# Patient Record
Sex: FEMALE | Race: WHITE | NOT HISPANIC OR LATINO | Employment: OTHER | URBAN - METROPOLITAN AREA
[De-identification: names, ages, dates, MRNs, and addresses within clinical notes are randomized per-mention and may not be internally consistent; named-entity substitution may affect disease eponyms.]

---

## 2021-05-17 ENCOUNTER — OFFICE VISIT (OUTPATIENT)
Dept: NEUROLOGY | Facility: CLINIC | Age: 51
End: 2021-05-17

## 2021-05-17 VITALS
RESPIRATION RATE: 18 BRPM | SYSTOLIC BLOOD PRESSURE: 112 MMHG | WEIGHT: 94.8 LBS | BODY MASS INDEX: 16.8 KG/M2 | OXYGEN SATURATION: 97 % | HEIGHT: 63 IN | HEART RATE: 67 BPM | DIASTOLIC BLOOD PRESSURE: 80 MMHG | TEMPERATURE: 98 F

## 2021-05-17 DIAGNOSIS — G35 MULTIPLE SCLEROSIS (HCC): Primary | ICD-10-CM

## 2021-05-17 DIAGNOSIS — F41.9 ANXIETY: ICD-10-CM

## 2021-05-17 DIAGNOSIS — R53.82 CHRONIC FATIGUE: ICD-10-CM

## 2021-05-17 DIAGNOSIS — R26.9 GAIT ABNORMALITY: ICD-10-CM

## 2021-05-17 PROBLEM — R25.2 SPASMS OF THE HANDS OR FEET: Status: ACTIVE | Noted: 2021-05-17

## 2021-05-17 PROCEDURE — 99204 OFFICE O/P NEW MOD 45 MIN: CPT | Performed by: PSYCHIATRY & NEUROLOGY

## 2021-05-17 RX ORDER — DEXTROAMPHETAMINE SACCHARATE, AMPHETAMINE ASPARTATE, DEXTROAMPHETAMINE SULFATE AND AMPHETAMINE SULFATE 2.5; 2.5; 2.5; 2.5 MG/1; MG/1; MG/1; MG/1
1 TABLET ORAL DAILY
COMMUNITY
Start: 2021-05-11 | End: 2022-11-11

## 2021-05-17 RX ORDER — SODIUM CHLORIDE 9 MG/ML
250 INJECTION, SOLUTION INTRAVENOUS ONCE
OUTPATIENT
Start: 2021-09-15

## 2021-05-17 RX ORDER — ONDANSETRON 8 MG/1
1 TABLET, ORALLY DISINTEGRATING ORAL EVERY 6 HOURS PRN
COMMUNITY
Start: 2021-05-07

## 2021-05-17 RX ORDER — FAMOTIDINE 10 MG/ML
20 INJECTION, SOLUTION INTRAVENOUS AS NEEDED
OUTPATIENT
Start: 2021-09-15

## 2021-05-17 RX ORDER — ACETAMINOPHEN 325 MG/1
650 TABLET ORAL EVERY 6 HOURS PRN
OUTPATIENT
Start: 2021-09-15

## 2021-05-17 RX ORDER — ACETAMINOPHEN 325 MG/1
650 TABLET ORAL ONCE
OUTPATIENT
Start: 2021-09-15

## 2021-05-17 RX ORDER — LEVOTHYROXINE SODIUM 0.05 MG/1
1 TABLET ORAL DAILY
COMMUNITY
Start: 2021-05-09 | End: 2022-06-15

## 2021-05-17 RX ORDER — OXCARBAZEPINE 150 MG/1
150 TABLET, FILM COATED ORAL 2 TIMES DAILY
Qty: 60 TABLET | Refills: 5 | Status: SHIPPED | OUTPATIENT
Start: 2021-05-17 | End: 2021-06-02

## 2021-05-17 RX ORDER — GABAPENTIN 300 MG/1
1 CAPSULE ORAL 2 TIMES DAILY
COMMUNITY
Start: 2021-03-03 | End: 2021-06-02 | Stop reason: SDUPTHER

## 2021-05-17 RX ORDER — DIPHENHYDRAMINE HYDROCHLORIDE 50 MG/ML
50 INJECTION INTRAMUSCULAR; INTRAVENOUS AS NEEDED
OUTPATIENT
Start: 2021-09-15

## 2021-05-17 RX ORDER — ESCITALOPRAM OXALATE 20 MG/1
10 TABLET ORAL DAILY
COMMUNITY
Start: 2021-03-06

## 2021-05-17 RX ORDER — FAMOTIDINE 20 MG
TABLET ORAL
COMMUNITY

## 2021-05-17 RX ORDER — LIDOCAINE 4 G/G
PATCH TOPICAL AS NEEDED
COMMUNITY

## 2021-05-17 RX ORDER — ESTRADIOL 0.5 MG/1
1 TABLET ORAL DAILY
COMMUNITY
Start: 2021-04-28 | End: 2022-06-16 | Stop reason: SDUPTHER

## 2021-05-17 RX ORDER — TIZANIDINE 4 MG/1
1 TABLET ORAL 4 TIMES DAILY
COMMUNITY
Start: 2021-05-07

## 2021-05-17 RX ORDER — OMEPRAZOLE 40 MG/1
1 CAPSULE, DELAYED RELEASE ORAL DAILY
COMMUNITY
Start: 2021-05-09

## 2021-05-17 RX ORDER — IBUPROFEN 400 MG/1
400 TABLET ORAL EVERY 6 HOURS PRN
OUTPATIENT
Start: 2021-09-15

## 2021-05-17 RX ORDER — MEPERIDINE HYDROCHLORIDE 50 MG/ML
25 INJECTION INTRAMUSCULAR; INTRAVENOUS; SUBCUTANEOUS
OUTPATIENT
Start: 2021-09-15

## 2021-05-17 RX ORDER — CLONAZEPAM 1 MG/1
1 TABLET ORAL 3 TIMES DAILY
COMMUNITY
Start: 2021-04-27 | End: 2022-11-11

## 2021-05-17 NOTE — PROGRESS NOTES
"Chief Complaint  Establish Care and Multiple Sclerosis    Subjective          Shayy Fajardo presents to [facility] for RRMS.      History of Present Illness    50 y.o. female referred by Dr Brian Lua.     Sx started 2006.  Right hand 3 - 5 digit numbness that progressed up arm.     Dx Jan 2007 by Dr Levy started on Gilenya.  Continued N/T.      Switched to Zinbryta and then Tysabri.     Switched to Ocrevus.  Last infusion March 2021.      Sx of right hand tremors.  Left UE flexor spasms.   Low back pain.  Sharp shooting pains when flexing neck.    Decreased attention and concentration.      Reviewed medical records:    Tx with Ocrevus.  Sx of weakness and ataxia.  N/T on left side of body.      MRI Brain/cervical 3/4/20 stable frontal white matter lesions., C2 lesion    DMT:  Zinbryta,     MSFC T25FW 11.22  Objective   Vital Signs:   /80   Pulse 67   Temp 98 °F (36.7 °C)   Resp 18   Ht 161 cm (63.4\")   Wt 43 kg (94 lb 12.8 oz)   SpO2 97%   BMI 16.58 kg/m²     Physical Exam  Eyes:      Extraocular Movements: EOM normal.      Pupils: Pupils are equal, round, and reactive to light.   Neurological:      Mental Status: She is oriented to person, place, and time.      Coordination: Finger-Nose-Finger Test, Heel to Shin Test and Romberg Test normal.      Gait: Gait is intact. Tandem walk normal.      Deep Tendon Reflexes: Strength normal.   Psychiatric:         Speech: Speech normal.        Neurologic Exam     Mental Status   Oriented to person, place, and time.   Registration: recalls 3 of 3 objects. Recall at 5 minutes: recalls 3 of 3 objects. Follows 3 step commands.   Attention: normal. Concentration: normal.   Speech: speech is normal   Level of consciousness: alert  Knowledge: good and consistent with education.   Able to name object. Able to read. Able to repeat. Able to write. Normal comprehension.     Cranial Nerves     CN II   Visual fields full to " confrontation.   Visual acuity: normal  Right visual field deficit: none  Left visual field deficit: none     CN III, IV, VI   Pupils are equal, round, and reactive to light.  Extraocular motions are normal.   Right pupil: Shape: regular. Reactivity: brisk. Consensual response: intact.   Left pupil: Shape: regular. Reactivity: brisk. Consensual response: intact.   Nystagmus: none   Diplopia: none  Ophthalmoparesis: none  Upgaze: normal  Downgaze: normal  Conjugate gaze: present  Vestibulo-ocular reflex: present    CN V   Facial sensation intact.   Right corneal reflex: normal  Left corneal reflex: normal    CN VII   Right facial weakness: none  Left facial weakness: none    CN VIII   Hearing: intact    CN IX, X   Palate: symmetric  Right gag reflex: normal  Left gag reflex: normal    CN XI   Right sternocleidomastoid strength: normal  Left sternocleidomastoid strength: normal    CN XII   Tongue: not atrophic  Fasciculations: absent  Tongue deviation: none    Motor Exam   Muscle bulk: normal  Overall muscle tone: normal  Right arm tone: normal  Left arm tone: normal  Right leg tone: normal  Left leg tone: normal    Strength   Strength 5/5 throughout.     Sensory Exam   Light touch normal.   Vibration normal.   Proprioception normal.   Pinprick normal.     Gait, Coordination, and Reflexes     Gait  Gait: normal    Coordination   Romberg: negative  Finger to nose coordination: normal  Heel to shin coordination: normal  Tandem walking coordination: normal    Tremor   Resting tremor: absent  Intention tremor: absent  Action tremor: absent    Reflexes   Reflexes 2+ except as noted.      Result Review :   The following data was reviewed by: Rafi Gomez MD on 05/17/2021:      Data reviewed: Radiologic studies MRI B/C and Consultant notes Dr Levy          Assessment and Plan {CC Problem List  Visit Diagnosis   ROS  Review (Popup)  Health Maintenance  Quality  BestPractice  Medications  SmartSets  SnapShot  Encounters  Media :23}   Diagnoses and all orders for this visit:    1. Multiple sclerosis (CMS/HCC) (Primary)  Assessment & Plan:  MSFC indicates need for PT/OT/SLP    Continue Ocrevus    MRI Brain/cervical     Orders:  -     MRI Brain With & Without Contrast; Future  -     MRI Cervical Spine With & Without Contrast; Future  -     Ambulatory Referral to Physical Therapy  -     Ambulatory Referral to Occupational Therapy  -     Ambulatory Referral to Speech Therapy    2. Gait abnormality  Assessment & Plan:  Start Ampyra       3. Chronic fatigue  Assessment & Plan:  Adderall       4. Anxiety  -     Ambulatory Referral to Psychiatry    Other orders  -     sodium chloride 0.9 % infusion 250 mL  -     acetaminophen (TYLENOL) tablet 650 mg  -     methylPREDNISolone sodium succinate (SOLU-Medrol) 100 mg in sodium chloride 0.9 % 100 mL IVPB  -     Ocrelizumab 600 mg in sodium chloride 0.9 % 500 mL chemo IV  -     acetaminophen (TYLENOL) tablet 650 mg  -     ibuprofen (ADVIL,MOTRIN) tablet 400 mg  -     hydrocortisone sodium succinate (Solu-CORTEF) injection 100 mg  -     diphenhydrAMINE (BENADRYL) injection 50 mg  -     famotidine (PEPCID) injection 20 mg  -     meperidine (DEMEROL) injection 25 mg  -     OXcarbazepine (TRILEPTAL) 150 MG tablet; Take 1 tablet by mouth 2 (Two) Times a Day.  Dispense: 60 tablet; Refill: 5      Follow Up   Return in about 4 weeks (around 6/14/2021).  Patient was given instructions and counseling regarding her condition or for health maintenance advice. Please see specific information pulled into the AVS if appropriate.        [Alert] : alert [No Acute Distress] : no acute distress [Playful] : playful [Normocephalic] : normocephalic [PERRL] : PERRL [Conjunctivae with no discharge] : conjunctivae with no discharge [EOMI Bilateral] : EOMI bilateral [Clear Tympanic membranes with present light reflex and bony landmarks] : clear tympanic membranes with present light reflex and bony landmarks [Auricles Well Formed] : auricles well formed [Nares Patent] : nares patent [No Discharge] : no discharge [Pink Nasal Mucosa] : pink nasal mucosa [Palate Intact] : palate intact [Uvula Midline] : uvula midline [Trachea Midline] : trachea midline [Nonerythematous Oropharynx] : nonerythematous oropharynx [No Caries] : no caries [Symmetric Chest Rise] : symmetric chest rise [Supple, full passive range of motion] : supple, full passive range of motion [No Palpable Masses] : no palpable masses [Clear to Auscultation Bilaterally] : clear to auscultation bilaterally [Normoactive Precordium] : normoactive precordium [Regular Rate and Rhythm] : regular rate and rhythm [Normal S1, S2 present] : normal S1, S2 present [+2 Femoral Pulses] : +2 femoral pulses [No Murmurs] : no murmurs [NonTender] : non tender [Non Distended] : non distended [Soft] : soft [No Hepatomegaly] : no hepatomegaly [Normoactive Bowel Sounds] : normoactive bowel sounds [No Splenomegaly] : no splenomegaly [Sarkis 1] : Sarkis 1 [Central Urethral Opening] : central urethral opening [Testicles Descended Bilaterally] : testicles descended bilaterally [Patent] : patent [Normally Placed] : normally placed [No Abnormal Lymph Nodes Palpated] : no abnormal lymph nodes palpated [Symmetric Buttocks Creases] : symmetric buttocks creases [No Gait Asymmetry] : no gait asymmetry [Symmetric Hip Rotation] : symmetric hip rotation [No pain or deformities with palpation of bone, muscles, joints] : no pain or deformities with palpation of bone, muscles, joints [Normal Muscle Tone] : normal muscle tone [Straight] : straight [NoTuft of Hair] : no tuft of hair [No Spinal Dimple] : no spinal dimple [Cranial Nerves Grossly Intact] : cranial nerves grossly intact [+2 Patella DTR] : +2 patella DTR [No Rash or Lesions] : no rash or lesions

## 2021-05-18 ENCOUNTER — SPECIALTY PHARMACY (OUTPATIENT)
Dept: ONCOLOGY | Facility: HOSPITAL | Age: 51
End: 2021-05-18

## 2021-05-18 RX ORDER — DALFAMPRIDINE 10 MG/1
10 TABLET, FILM COATED, EXTENDED RELEASE ORAL 2 TIMES DAILY
Qty: 60 TABLET | Refills: 11 | Status: SHIPPED | OUTPATIENT
Start: 2021-05-18 | End: 2021-06-02

## 2021-05-18 NOTE — PROGRESS NOTES
Oral Neurology Medication Teaching        Patient Name/:     Shayy Fajardo   1970  Oral Neurology Medication Regimen:  Dalfampridine XR 10mg PO BID  Date Started Medication: pending acquisition           Initial Teaching Follow Up Comments      Safety      Storage instructions (away from children; away from heat/cold, sunlight, or moisture)       “How are you storing your medications?”, reminders on storage, proper handling (away from children, managing waste, etc.), disposal of medication with D/C or dosage change     Patient counseled on appropriate storage of medication. Store at room temp, away from pets and children. Pt verbalized understanding.       Adherence       patient and/or caregiver on how to take medication, take with/without food, assess their adherence potential, stress importance of adherence, ways to manage adherence (pill boxes, phone reminders, calendars), what to do if miss a dose    “How are you taking your medication?” “How are you remembering to take your medication?”, “How many doses have you missed?”, determine reasons for non-adherence (not remembering, side effects, etc), ways to improve, overadherence? Remind patient of ways to improve/maintain adherence    Reviewed plan for Dalfampridine XR 10mg (1 tablet) by mouth twice daily. Reviewed plan for missed doses. Pt voiced understanding. Discussed importance of compliance. Script in process at [facility] retail and will be mailed to patient.       Side Effects/Adverse Reactions       patient on potential side effects, s/s, ways to manage, when to call MD/seek help       Determine if patient experiencing side effects, ways to manage  Discussed potential side effects including but not limited to: N/V, headache, abdominal pain, insomnia, dizziness, and urinary tract infections. Discussed potential serious side effects of seizures and anaphylactic reactions.  Pt verbalized understanding.      Miscellaneous      Food  interactions, DDIs, financial issues Determine if patient started any new medications (analyze for DDI) No DDIs identified with planned medication list and Dalfampridine.       Additional Notes: Discussed aforementioned material with patient by phone. All questions and concerns addressed. Patient provided with my contact information and instructed to call if any additional questions should arise. Notified [facility] retail of new script.

## 2021-05-19 ENCOUNTER — TELEPHONE (OUTPATIENT)
Dept: NEUROLOGY | Facility: CLINIC | Age: 51
End: 2021-05-19

## 2021-05-19 RX ORDER — LAMOTRIGINE 25 MG/1
25 TABLET ORAL SEE ADMIN INSTRUCTIONS
Qty: 120 TABLET | Refills: 2 | Status: SHIPPED | OUTPATIENT
Start: 2021-05-19 | End: 2021-06-02

## 2021-05-19 NOTE — TELEPHONE ENCOUNTER
"Spoke with patient she states that \"she took her Oxcarbazepine yesterday and has felt funny/weird. She was not able to sleep very well last night and today she is dizzy, nauseated, walking funny & tremors have increased.\" She called the pharmacy and they suggested she reach out to us. Patient stated \"she is not going to take her morning dose since she is feeling nauseated.\" Informed patient I would pass this message along to Dr. Gomez and will get back with her as soon as possible. She verbalized understanding.       Message from the hub to us about patient call:   PT STATES SHE HAS BEEN TAKING THE MEDICATION SINCE VISIT ON Monday, 5/17/21. STATES MEDICATION IS MAKING HER TREMORS MUCH WORSE, SEVERE NAUSEA, FEELING LIKE SHE IS GOING TO VOMIT, SEVERE DIZZINESS, AND IMPAIRED BALANCE/FEELING UNSTEADY.    "

## 2021-05-19 NOTE — TELEPHONE ENCOUNTER
Caller: Shayy Fajardo    Relationship to patient: Self    Best call back number: [phone]    Chief complaint: SEVERE REACTION TO TRILEPTAL MEDICATION    Patient directed to call 911 or go to their nearest emergency room. - WARM TRANSFERRED TO [facility] TO ADVISE PT FURTHER AS THIS IS A REACTION TO NEW MEDICATION PRESCRIBED BY DR. MILNER.    Additional notes: PT STATES SHE HAS BEEN TAKING THE MEDICATION SINCE VISIT ON Monday, 5/17/21. STATES MEDICATION IS MAKING HER TREMORS MUCH WORSE, SEVERE NAUSEA, FEELING LIKE SHE IS GOING TO VOMIT, SEVERE DIZZINESS, AND IMPAIRED BALANCE/FEELING UNSTEADY.    DOCUMENTING TELEPHONE CALL PER [facility] PROTOCOL FOR RED FLAG SCENARIOS.

## 2021-05-27 ENCOUNTER — TELEPHONE (OUTPATIENT)
Dept: NEUROLOGY | Facility: CLINIC | Age: 51
End: 2021-05-27

## 2021-05-27 DIAGNOSIS — G35 MULTIPLE SCLEROSIS (HCC): Primary | ICD-10-CM

## 2021-05-27 NOTE — TELEPHONE ENCOUNTER
Caller: Shayy Fajardo    Relationship: Self    Best call back number: [phone]    What medications are you currently taking:   Current Outpatient Medications on File Prior to Visit   Medication Sig Dispense Refill   • amphetamine-dextroamphetamine (ADDERALL) 10 MG tablet Take 1 tablet by mouth Daily.     • clonazePAM (KlonoPIN) 1 MG tablet Take 1 tablet by mouth 3 (Three) Times a Day.       • Dalfampridine ER 10 MG tablet sustained-release 12 hour Take 10 mg by mouth 2 (Two) Times a Day. 60 tablet 11   • escitalopram (LEXAPRO) 20 MG tablet Take 10 mg by mouth Daily.     • estradiol (ESTRACE) 0.5 MG tablet Take 1 tablet by mouth Daily.     • Estrogens Conjugated (PREMARIN PO) Take  by mouth 2 (Two) Times a Week.     • gabapentin (NEURONTIN) 300 MG capsule Take 1 capsule by mouth 2 (two) times a day.     • Ginger, Zingiber officinalis, (Ginger Root) 550 MG capsule Take  by mouth.     • lamoTRIgine (LaMICtal) 25 MG tablet Take 1 tablet by mouth See Admin Instructions. Take one tablet twice a day for one week, then increase two tablets twice a day 120 tablet 2   • levothyroxine (SYNTHROID, LEVOTHROID) 50 MCG tablet Take 1 tablet by mouth Daily.     • Lidocaine 4 % patch Apply  topically As Needed.     • Ocrelizumab (OCREVUS IV) Infuse  into a venous catheter. Every 6 months     • omeprazole (priLOSEC) 40 MG capsule Take 1 capsule by mouth Daily.     • ondansetron ODT (ZOFRAN-ODT) 8 MG disintegrating tablet Place 1 tablet under the tongue Every 6 (Six) Hours As Needed.     • OXcarbazepine (TRILEPTAL) 150 MG tablet Take 1 tablet by mouth 2 (Two) Times a Day. 60 tablet 5   • tiZANidine (ZANAFLEX) 4 MG tablet Take 1 tablet by mouth 4 (Four) Times a Day.     • Vitamin D, Cholecalciferol, 25 MCG (1000 UT) capsule Take  by mouth.       No current facility-administered medications on file prior to visit.        When did you start taking these medications: NA    Which medication are you concerned about: LAMOTRIGINE  AND DALFAMPRIDINE    Who prescribed you this medication:     What are your concerns: PATIENT STATES THAT SINCE SHE STARTED TAKING THESE TWO RX'S SHE HAS HAD BAD MIGRAINES, CANT SLEEP MORE THAN 2-3 HOURS, AGGRESSIVE BEHAVIOR AND ALSO HAS BEEN FEELING ELECTRICAL SHOCKS IN CERVICAL AREA WHERE LESION AND DOWN TO HER FEET AND ALSO HAVING HUMMING NOISES IN EARS. PLEASE ADVISE.     How long have you been taking these medications: NA    How long have you had these concerns: NA

## 2021-06-02 RX ORDER — GABAPENTIN 300 MG/1
600 CAPSULE ORAL 2 TIMES DAILY
Qty: 120 CAPSULE | Refills: 5 | Status: SHIPPED | OUTPATIENT
Start: 2021-06-02 | End: 2022-01-27

## 2021-06-03 ENCOUNTER — APPOINTMENT (OUTPATIENT)
Dept: CT IMAGING | Facility: HOSPITAL | Age: 51
End: 2021-06-03

## 2021-06-03 ENCOUNTER — TELEPHONE (OUTPATIENT)
Dept: NEUROLOGY | Facility: CLINIC | Age: 51
End: 2021-06-03

## 2021-06-03 ENCOUNTER — HOSPITAL ENCOUNTER (EMERGENCY)
Facility: HOSPITAL | Age: 51
Discharge: HOME | End: 2021-06-03
Attending: EMERGENCY MEDICINE | Admitting: EMERGENCY MEDICINE

## 2021-06-03 VITALS
SYSTOLIC BLOOD PRESSURE: 112 MMHG | WEIGHT: 93 LBS | DIASTOLIC BLOOD PRESSURE: 91 MMHG | OXYGEN SATURATION: 97 % | BODY MASS INDEX: 15.88 KG/M2 | HEART RATE: 57 BPM | RESPIRATION RATE: 20 BRPM | HEIGHT: 64 IN | TEMPERATURE: 97.8 F

## 2021-06-03 DIAGNOSIS — Z79.899 CHRONICALLY ON BENZODIAZEPINE THERAPY: ICD-10-CM

## 2021-06-03 DIAGNOSIS — F13.930 BENZODIAZEPINE WITHDRAWAL, UNCOMPLICATED (HCC): ICD-10-CM

## 2021-06-03 DIAGNOSIS — R19.7 DIARRHEA, UNSPECIFIED TYPE: Primary | ICD-10-CM

## 2021-06-03 LAB
ALBUMIN SERPL-MCNC: 4.4 G/DL (ref 3.5–5.2)
ALBUMIN/GLOB SERPL: 1.8 G/DL
ALP SERPL-CCNC: 74 U/L (ref 39–117)
ALT SERPL W P-5'-P-CCNC: 10 U/L (ref 1–33)
ANION GAP SERPL CALCULATED.3IONS-SCNC: 10 MMOL/L (ref 5–15)
AST SERPL-CCNC: 18 U/L (ref 1–32)
BASOPHILS # BLD AUTO: 0.03 10*3/MM3 (ref 0–0.2)
BASOPHILS NFR BLD AUTO: 0.6 % (ref 0–1.5)
BILIRUB SERPL-MCNC: 0.6 MG/DL (ref 0–1.2)
BILIRUB UR QL STRIP: NEGATIVE
BUN SERPL-MCNC: 12 MG/DL (ref 6–20)
BUN/CREAT SERPL: 14.8 (ref 7–25)
CALCIUM SPEC-SCNC: 9.4 MG/DL (ref 8.6–10.5)
CHLORIDE SERPL-SCNC: 104 MMOL/L (ref 98–107)
CLARITY UR: CLEAR
CO2 SERPL-SCNC: 26 MMOL/L (ref 22–29)
COLOR UR: YELLOW
CREAT SERPL-MCNC: 0.81 MG/DL (ref 0.57–1)
DEPRECATED RDW RBC AUTO: 41.4 FL (ref 37–54)
EOSINOPHIL # BLD AUTO: 0.04 10*3/MM3 (ref 0–0.4)
EOSINOPHIL NFR BLD AUTO: 0.8 % (ref 0.3–6.2)
ERYTHROCYTE [DISTWIDTH] IN BLOOD BY AUTOMATED COUNT: 12.6 % (ref 12.3–15.4)
GFR SERPL CREATININE-BSD FRML MDRD: 75 ML/MIN/1.73
GLOBULIN UR ELPH-MCNC: 2.4 GM/DL
GLUCOSE BLDC GLUCOMTR-MCNC: 95 MG/DL (ref 70–130)
GLUCOSE SERPL-MCNC: 98 MG/DL (ref 65–99)
GLUCOSE UR STRIP-MCNC: NEGATIVE MG/DL
HCT VFR BLD AUTO: 38.4 % (ref 34–46.6)
HGB BLD-MCNC: 12.6 G/DL (ref 12–15.9)
HGB UR QL STRIP.AUTO: NEGATIVE
HOLD SPECIMEN: NORMAL
IMM GRANULOCYTES # BLD AUTO: 0.01 10*3/MM3 (ref 0–0.05)
IMM GRANULOCYTES NFR BLD AUTO: 0.2 % (ref 0–0.5)
KETONES UR QL STRIP: ABNORMAL
LEUKOCYTE ESTERASE UR QL STRIP.AUTO: NEGATIVE
LYMPHOCYTES # BLD AUTO: 0.61 10*3/MM3 (ref 0.7–3.1)
LYMPHOCYTES NFR BLD AUTO: 11.5 % (ref 19.6–45.3)
MAGNESIUM SERPL-MCNC: 1.9 MG/DL (ref 1.6–2.6)
MCH RBC QN AUTO: 29.4 PG (ref 26.6–33)
MCHC RBC AUTO-ENTMCNC: 32.8 G/DL (ref 31.5–35.7)
MCV RBC AUTO: 89.7 FL (ref 79–97)
MONOCYTES # BLD AUTO: 0.5 10*3/MM3 (ref 0.1–0.9)
MONOCYTES NFR BLD AUTO: 9.4 % (ref 5–12)
NEUTROPHILS NFR BLD AUTO: 4.12 10*3/MM3 (ref 1.7–7)
NEUTROPHILS NFR BLD AUTO: 77.5 % (ref 42.7–76)
NITRITE UR QL STRIP: NEGATIVE
NRBC BLD AUTO-RTO: 0 /100 WBC (ref 0–0.2)
PH UR STRIP.AUTO: 7 [PH] (ref 5–8)
PLATELET # BLD AUTO: 252 10*3/MM3 (ref 140–450)
PMV BLD AUTO: 10.6 FL (ref 6–12)
POTASSIUM SERPL-SCNC: 4.5 MMOL/L (ref 3.5–5.2)
PROT SERPL-MCNC: 6.8 G/DL (ref 6–8.5)
PROT UR QL STRIP: NEGATIVE
QT INTERVAL: 432 MS
QTC INTERVAL: 409 MS
RBC # BLD AUTO: 4.28 10*6/MM3 (ref 3.77–5.28)
SODIUM SERPL-SCNC: 140 MMOL/L (ref 136–145)
SP GR UR STRIP: 1.01 (ref 1–1.03)
TROPONIN T SERPL-MCNC: <0.01 NG/ML (ref 0–0.03)
TSH SERPL DL<=0.05 MIU/L-ACNC: 1.53 UIU/ML (ref 0.27–4.2)
UROBILINOGEN UR QL STRIP: ABNORMAL
WBC # BLD AUTO: 5.31 10*3/MM3 (ref 3.4–10.8)
WHOLE BLOOD HOLD SPECIMEN: NORMAL
WHOLE BLOOD HOLD SPECIMEN: NORMAL

## 2021-06-03 PROCEDURE — 84443 ASSAY THYROID STIM HORMONE: CPT | Performed by: EMERGENCY MEDICINE

## 2021-06-03 PROCEDURE — 96361 HYDRATE IV INFUSION ADD-ON: CPT

## 2021-06-03 PROCEDURE — 80053 COMPREHEN METABOLIC PANEL: CPT

## 2021-06-03 PROCEDURE — 70450 CT HEAD/BRAIN W/O DYE: CPT

## 2021-06-03 PROCEDURE — 96375 TX/PRO/DX INJ NEW DRUG ADDON: CPT

## 2021-06-03 PROCEDURE — 96374 THER/PROPH/DIAG INJ IV PUSH: CPT

## 2021-06-03 PROCEDURE — 84484 ASSAY OF TROPONIN QUANT: CPT

## 2021-06-03 PROCEDURE — 81003 URINALYSIS AUTO W/O SCOPE: CPT

## 2021-06-03 PROCEDURE — 93005 ELECTROCARDIOGRAM TRACING: CPT

## 2021-06-03 PROCEDURE — 85025 COMPLETE CBC W/AUTO DIFF WBC: CPT

## 2021-06-03 PROCEDURE — 99284 EMERGENCY DEPT VISIT MOD MDM: CPT

## 2021-06-03 PROCEDURE — 83735 ASSAY OF MAGNESIUM: CPT

## 2021-06-03 PROCEDURE — 25010000002 LORAZEPAM PER 2 MG: Performed by: EMERGENCY MEDICINE

## 2021-06-03 PROCEDURE — 82962 GLUCOSE BLOOD TEST: CPT

## 2021-06-03 PROCEDURE — 25010000002 ONDANSETRON PER 1 MG: Performed by: EMERGENCY MEDICINE

## 2021-06-03 RX ORDER — LORAZEPAM 2 MG/ML
1 INJECTION INTRAMUSCULAR ONCE
Status: COMPLETED | OUTPATIENT
Start: 2021-06-03 | End: 2021-06-03

## 2021-06-03 RX ORDER — ONDANSETRON 2 MG/ML
4 INJECTION INTRAMUSCULAR; INTRAVENOUS
Status: DISCONTINUED | OUTPATIENT
Start: 2021-06-03 | End: 2021-06-03 | Stop reason: HOSPADM

## 2021-06-03 RX ORDER — CLONAZEPAM 1 MG/1
1 TABLET ORAL DAILY
Qty: 8 TABLET | Refills: 0 | Status: SHIPPED | OUTPATIENT
Start: 2021-06-03 | End: 2022-11-11

## 2021-06-03 RX ORDER — LOPERAMIDE HYDROCHLORIDE 2 MG/1
2 CAPSULE ORAL 4 TIMES DAILY PRN
Qty: 20 CAPSULE | Refills: 0 | Status: SHIPPED | OUTPATIENT
Start: 2021-06-03 | End: 2022-11-11

## 2021-06-03 RX ORDER — SODIUM CHLORIDE 0.9 % (FLUSH) 0.9 %
10 SYRINGE (ML) INJECTION AS NEEDED
Status: DISCONTINUED | OUTPATIENT
Start: 2021-06-03 | End: 2021-06-03 | Stop reason: HOSPADM

## 2021-06-03 RX ORDER — LANOLIN ALCOHOL/MO/W.PET/CERES
1000 CREAM (GRAM) TOPICAL DAILY
COMMUNITY

## 2021-06-03 RX ADMIN — ONDANSETRON 4 MG: 2 INJECTION INTRAMUSCULAR; INTRAVENOUS at 11:45

## 2021-06-03 RX ADMIN — LORAZEPAM 1 MG: 2 INJECTION INTRAMUSCULAR; INTRAVENOUS at 11:51

## 2021-06-03 RX ADMIN — SODIUM CHLORIDE 1000 ML: 9 INJECTION, SOLUTION INTRAVENOUS at 11:44

## 2021-06-03 NOTE — TELEPHONE ENCOUNTER
Called x1. LVM letting patient know what Dr. Gomez said. And recommended that she follow up with her PCP about the diarrhea. Left call back number.

## 2021-06-03 NOTE — ED PROVIDER NOTES
[place]    EMERGENCY DEPARTMENT ENCOUNTER      Pt Name: Shayy Fajardo  MRN: [id number]  YOB: 1970  Date of evaluation: 6/3/2021  Provider: Ronald Haddad,     CHIEF COMPLAINT       Chief Complaint   Patient presents with   • Altered Mental Status     recently taken off all meds for MS and new meds started (New doc). Detox? AMS in bed for 2 days up to restroom today and fell on floor no LOC no head injury          HISTORY OF PRESENT ILLNESS  (Location/Symptom, Timing/Onset, Context/Setting, Quality, Duration, Modifying Factors, Severity.)   Shayy Fajardo is a 50 y.o. female who presents to the emergency department via EMS for evaluation of altered mental state. History provided by nursing staff, EMS providers, patient is having difficulty with speaking full sentences story my evaluation, very limited responses during my questioning. HPI severely limited secondary to this. Per EMS to nursing staff the patient was recently transferred over to an in-house neurology team, and has been seeing Dr. Gomez, and had multiple medication adjustments for her underlying multiple sclerosis. They note she was stopped from her Klonopin, she notes without titration, and has had increased anxiety, tremors and upper extremity right arm tremor which is been worse than her normal baseline. Feels he may be withdrawing from her medications that she was transitioned over to new multiple sclerosis medicine. She is unsure of the names of her medications. She denies any chest pain, shortness of breath, abdominal pain. She does endorse loose stools, nonbloody in nature. Denies any headache. Has been trying to do symptomatic therapies at home without much success.      Nursing notes were reviewed.    REVIEW OF SYSTEMS    (2-9 systems for level 4, 10 or more for level 5)   ROS:  General:  No fevers, no chills, + generalized weakness  Cardiovascular:  No chest pain, no palpitations  Respiratory:  No shortness of  breath  Gastrointestinal:  No pain, no nausea, no vomiting, no diarrhea  Neurologic: Positive weakness, generalized tremor especially right upper extremity  Psychiatric:  + anxiety  Genitourinary:  No dysuria, no hematuria    Except as noted above the remainder of the review of systems was reviewed and negative.       PAST MEDICAL HISTORY     Past Medical History:   Diagnosis Date   • Anxiety    • Depression    • Developmental delay    • Difficulty walking    • Dizziness    • Fainting    • Kidney stones    • Memory loss    • Migraine    • Multiple sclerosis (CMS/HCC)    • Skin cancer    • Stomach problems    • Thyroid disease    • Weakness          SURGICAL HISTORY       Past Surgical History:   Procedure Laterality Date   •  SECTION     • HYSTERECTOMY     • LAPAROTOMY OOPHERECTOMY           CURRENT MEDICATIONS       Current Facility-Administered Medications:   •  ondansetron (ZOFRAN) injection 4 mg, 4 mg, Intravenous, Q30 Min PRN, Ronald Hdadad DO, 4 mg at 21 1145  •  sodium chloride 0.9 % flush 10 mL, 10 mL, Intravenous, PRN, Emergency, Triage Protocol, MD    Current Outpatient Medications:   •  amphetamine-dextroamphetamine (ADDERALL) 10 MG tablet, Take 1 tablet by mouth Daily., Disp: , Rfl:   •  escitalopram (LEXAPRO) 20 MG tablet, Take 10 mg by mouth Daily., Disp: , Rfl:   •  estradiol (ESTRACE) 0.5 MG tablet, Take 1 tablet by mouth Daily., Disp: , Rfl:   •  Estrogens Conjugated (PREMARIN PO), Take  by mouth 2 (Two) Times a Week., Disp: , Rfl:   •  gabapentin (NEURONTIN) 300 MG capsule, Take 2 capsules by mouth 2 (two) times a day., Disp: 120 capsule, Rfl: 5  •  Ginger, Zingiber officinalis, (Ginger Root) 550 MG capsule, Take  by mouth., Disp: , Rfl:   •  levothyroxine (SYNTHROID, LEVOTHROID) 50 MCG tablet, Take 1 tablet by mouth Daily., Disp: , Rfl:   •  Lidocaine 4 % patch, Apply  topically As Needed., Disp: , Rfl:   •  Ocrelizumab (OCREVUS IV), Infuse  into a venous catheter. Every 6  months, Disp: , Rfl:   •  omeprazole (priLOSEC) 40 MG capsule, Take 1 capsule by mouth Daily., Disp: , Rfl:   •  ondansetron ODT (ZOFRAN-ODT) 8 MG disintegrating tablet, Place 1 tablet under the tongue Every 6 (Six) Hours As Needed., Disp: , Rfl:   •  tiZANidine (ZANAFLEX) 4 MG tablet, Take 1 tablet by mouth 4 (Four) Times a Day., Disp: , Rfl:   •  vitamin B-12 (CYANOCOBALAMIN) 1000 MCG tablet, Take 1,000 mcg by mouth Daily., Disp: , Rfl:   •  Vitamin D, Cholecalciferol, 25 MCG (1000 UT) capsule, Take  by mouth., Disp: , Rfl:   •  clonazePAM (KlonoPIN) 1 MG tablet, Take 1 tablet by mouth 3 (Three) Times a Day.  , Disp: , Rfl:   •  clonazePAM (KlonoPIN) 1 MG tablet, Take 1 tablet by mouth Daily. Taperin tablet daily x2 days, half tablet daily x2 days, then half tablet every other day, Disp: 8 tablet, Rfl: 0  •  loperamide (IMODIUM) 2 MG capsule, Take 1 capsule by mouth 4 (Four) Times a Day As Needed for Diarrhea., Disp: 20 capsule, Rfl: 0    ALLERGIES     Macrobid [nitrofurantoin], Morphine, and Naproxen    FAMILY HISTORY       Family History   Problem Relation Age of Onset   • Skin cancer Mother    • Leukemia Mother    • Heart disease Father    • Thyroid disease Father    • Breast cancer Maternal Aunt    • Breast cancer Maternal Grandmother           SOCIAL HISTORY       Social History     Socioeconomic History   • Marital status:      Spouse name: Not on file   • Number of children: Not on file   • Years of education: Not on file   • Highest education level: Not on file   Tobacco Use   • Smoking status: Never Smoker   • Smokeless tobacco: Never Used   Vaping Use   • Vaping Use: Never used   Substance and Sexual Activity   • Alcohol use: Never   • Drug use: Never         PHYSICAL EXAM    (up to 7 for level 4, 8 or more for level 5)     Vitals:    21 1330 21 1400 21 1430 21 1500   BP: 104/64 105/66 98/77 112/91   BP Location:       Patient Position:       Pulse: 64 63 62 57   Resp:        Temp:       TempSrc:       SpO2: 98% 96% 97% 97%   Weight:       Height:           Physical Exam  General : Patient is laying back in a gurney, generalized tremors, predominantly in the right upper extremity, clenching eyes closed, when questioned patient is slow to respond with one-word answers, very hard to discern what she is saying.  HEENT: Pupils are equally round and reactive to light, EOMI, conjunctivae clear, sclerae white, there is no injection no icterus.  Oral mucosa is dry  Neck: Neck is supple, trachea midline  Cardiac: Heart regular rate, rhythm, no murmurs, rubs, or gallops  Lungs: Lungs are clear to auscultation, there is no wheezing, rhonchi, or rales. There is no use of accessory muscles  Chest wall: There is no tenderness to palpation over the chest wall or over ribs  Abdomen: Abdomen is soft, nontender, nondistended. There are no firm or pulsatile masses, no rebound rigidity or guarding.   Musculoskeletal: No peripheral edema, voluntarily moving all 4 extremities, not following commands, limited evaluation muscle strength testing.  Neuro: Patient laying in the bed, eyes are clenched closed, there is a resting tremor in the right upper extremity, when questioning she is responding with one-word answers but is hard to decipher which she is trying to say. No slurred speech, unable to fully assess neurological examination is patient is not following commands when questioned. No muscle rigidity on the bilateral upper or lower extremities  Dermatology: Skin is warm and dry        DIAGNOSTIC RESULTS     EKG: All EKG's are interpreted by the Emergency Department Physician who either signs or Co-signs this chart in the absence of a cardiologist.    ECG 12 Lead   Final Result   Test Reason : Weak/Dizzy/AMS protocol   Blood Pressure :   */*   mmHG   Vent. Rate :  54 BPM     Atrial Rate : 312 BPM      P-R Int :   * ms          QRS Dur :  66 ms       QT Int : 432 ms       P-R-T Axes :  85  79  79 degrees       QTc Int : 409 ms      tremor at baseline, NSR at 66 bpm   Abnormal ECG   No previous ECGs available   Confirmed by YU SOTO MD ([id number]) on 6/3/2021 11:00:19 AM      Referred By: EDMD           Confirmed By: YU SOTO MD          RADIOLOGY:   Non-plain film images such as CT, Ultrasound and MRI are read by the radiologist. Plain radiographic images are visualized and preliminarily interpreted by the emergency physician with the below findings:      [] Radiologist's Report Reviewed:  CT Head Without Contrast   Preliminary Result   No acute intracranial findings specifically no acute   intracranial hemorrhage.       D:  06/03/2021   E:  06/03/2021                    ED BEDSIDE ULTRASOUND:   Performed by ED Physician - none    LABS:    I have reviewed and interpreted all of the currently available lab results from this visit (if applicable):  Results for orders placed or performed during the hospital encounter of 06/03/21   Comprehensive Metabolic Panel    Specimen: Blood   Result Value Ref Range    Glucose 98 65 - 99 mg/dL    BUN 12 6 - 20 mg/dL    Creatinine 0.81 0.57 - 1.00 mg/dL    Sodium 140 136 - 145 mmol/L    Potassium 4.5 3.5 - 5.2 mmol/L    Chloride 104 98 - 107 mmol/L    CO2 26.0 22.0 - 29.0 mmol/L    Calcium 9.4 8.6 - 10.5 mg/dL    Total Protein 6.8 6.0 - 8.5 g/dL    Albumin 4.40 3.50 - 5.20 g/dL    ALT (SGPT) 10 1 - 33 U/L    AST (SGOT) 18 1 - 32 U/L    Alkaline Phosphatase 74 39 - 117 U/L    Total Bilirubin 0.6 0.0 - 1.2 mg/dL    eGFR Non African Amer 75 >60 mL/min/1.73    Globulin 2.4 gm/dL    A/G Ratio 1.8 g/dL    BUN/Creatinine Ratio 14.8 7.0 - 25.0    Anion Gap 10.0 5.0 - 15.0 mmol/L   Troponin    Specimen: Blood   Result Value Ref Range    Troponin T <0.010 0.000 - 0.030 ng/mL   Magnesium    Specimen: Blood   Result Value Ref Range    Magnesium 1.9 1.6 - 2.6 mg/dL   Urinalysis With Microscopic If Indicated (No Culture) - Urine, Clean Catch    Specimen: Urine, Clean Catch   Result Value  Ref Range    Color, UA Yellow Yellow, Straw    Appearance, UA Clear Clear    pH, UA 7.0 5.0 - 8.0    Specific Gravity, UA 1.007 1.001 - 1.030    Glucose, UA Negative Negative    Ketones, UA 15 mg/dL (1+) (A) Negative    Bilirubin, UA Negative Negative    Blood, UA Negative Negative    Protein, UA Negative Negative    Leuk Esterase, UA Negative Negative    Nitrite, UA Negative Negative    Urobilinogen, UA 0.2 E.U./dL 0.2 - 1.0 E.U./dL   CBC Auto Differential    Specimen: Blood   Result Value Ref Range    WBC 5.31 3.40 - 10.80 10*3/mm3    RBC 4.28 3.77 - 5.28 10*6/mm3    Hemoglobin 12.6 12.0 - 15.9 g/dL    Hematocrit 38.4 34.0 - 46.6 %    MCV 89.7 79.0 - 97.0 fL    MCH 29.4 26.6 - 33.0 pg    MCHC 32.8 31.5 - 35.7 g/dL    RDW 12.6 12.3 - 15.4 %    RDW-SD 41.4 37.0 - 54.0 fl    MPV 10.6 6.0 - 12.0 fL    Platelets 252 140 - 450 10*3/mm3    Neutrophil % 77.5 (H) 42.7 - 76.0 %    Lymphocyte % 11.5 (L) 19.6 - 45.3 %    Monocyte % 9.4 5.0 - 12.0 %    Eosinophil % 0.8 0.3 - 6.2 %    Basophil % 0.6 0.0 - 1.5 %    Immature Grans % 0.2 0.0 - 0.5 %    Neutrophils, Absolute 4.12 1.70 - 7.00 10*3/mm3    Lymphocytes, Absolute 0.61 (L) 0.70 - 3.10 10*3/mm3    Monocytes, Absolute 0.50 0.10 - 0.90 10*3/mm3    Eosinophils, Absolute 0.04 0.00 - 0.40 10*3/mm3    Basophils, Absolute 0.03 0.00 - 0.20 10*3/mm3    Immature Grans, Absolute 0.01 0.00 - 0.05 10*3/mm3    nRBC 0.0 0.0 - 0.2 /100 WBC   TSH    Specimen: Blood   Result Value Ref Range    TSH 1.530 0.270 - 4.200 uIU/mL   POC Glucose Once    Specimen: Blood   Result Value Ref Range    Glucose 95 70 - 130 mg/dL   ECG 12 Lead   Result Value Ref Range    QT Interval 432 ms    QTC Interval 409 ms   Light Blue Top   Result Value Ref Range    Extra Tube hold for add-on    Green Top (Gel)   Result Value Ref Range    Extra Tube Hold for add-ons.    Lavender Top   Result Value Ref Range    Extra Tube hold for add-on    Gold Top - SST   Result Value Ref Range    Extra Tube Hold for add-ons.     Campbell Top   Result Value Ref Range    Extra Tube Hold for add-ons.         All other labs were within normal range or not returned as of this dictation.      EMERGENCY DEPARTMENT COURSE and DIFFERENTIAL DIAGNOSIS/MDM:   Vitals:    Vitals:    06/03/21 1330 06/03/21 1400 06/03/21 1430 06/03/21 1500   BP: 104/64 105/66 98/77 112/91   BP Location:       Patient Position:       Pulse: 64 63 62 57   Resp:       Temp:       TempSrc:       SpO2: 98% 96% 97% 97%   Weight:       Height:                Patient with a history of multiple underlying chronic comorbidities including multiple sclerosis who has had pretty significant difficulty with medication transitioning. What I can gather she has been taking off of her Klonopin without taper since has had diarrhea, is not felt well, very weak with increasing tremors especially right upper extremity.  Blood work labs and imaging reviewed as above.  No acute or significant normality, small ketones in the urine.  Electrolytes are stable.  Imaging is unremarkable.  On reevaluation patient is resting much more comfortably, she is awake alert answer questions appropriately, states she does feel generally weak, likely from her underlying diarrhea.  Tolerating IV fluids currently.  Did attempt to reach the patient's neurologist, Dr. Gomez, unable to get in touch at this time during lunch hour.  Come to get in touch with patient's neurologist at 1:45 PM, unable to get in touch with the office, no answer.  Labs reviewed as above.  Patient is tolerating fluids, she was given IV fluids in the ED, I updated her on the results, likely secondary to her benzodiazepine withdrawal causing her diarrhea.  Feel like she should continue with a tapering dosage over the next few days with symptomatic treatments for diarrhea, to negative fluid hydration, following closely with her neurology specialist who she will call for follow-up appointment this upcoming week.  Return precautions discussed with  the patient and her  at bedside who are both agreeable to the current plan of care, understands return precautions discussed. I had a discussion with the patient/family regarding diagnosis, diagnostic results, treatment plan, and medications.  The patient/family indicated understanding of these instructions.  I spent adequate time at the bedside preceding discharge necessary to personally discuss the aftercare instructions, giving patient education, providing explanations of the results of our evaluations/findings, and my decision making to assure that the patient/family understand the plan of care.  Time was allotted to answer questions at that time and throughout the ED course.  Emphasis was placed on timely follow-up after discharge.  I also discussed the potential for the development of an acute emergent condition requiring further evaluation, admission, or even surgical intervention. I discussed that we found nothing during the visit today indicating the need for further workup, admission, or the presence of an unstable medical condition.  I encouraged the patient to return to the emergency department immediately for ANY concerns, worsening, new complaints, or if symptoms persist and unable to seek follow-up in a timely fashion.  The patient/family expressed understanding and agreement with this plan.  The patient will follow-up with their PCP in 1-2 days for reevaluation.       MEDICATIONS ADMINISTERED IN ED:  Medications   sodium chloride 0.9 % flush 10 mL (has no administration in time range)   ondansetron (ZOFRAN) injection 4 mg (4 mg Intravenous Given 6/3/21 1145)   sodium chloride 0.9 % bolus 1,000 mL (1,000 mL Intravenous New Bag 6/3/21 1144)   LORazepam (ATIVAN) injection 1 mg (1 mg Intravenous Given 6/3/21 1151)       PROCEDURES:  Procedures    CRITICAL CARE TIME    Total Critical Care time was 0 minutes, excluding separately reportable procedures.   There was a high probability of clinically  significant/life threatening deterioration in the patient's condition which required my urgent intervention.      FINAL IMPRESSION      1. Diarrhea, unspecified type    2. Chronically on benzodiazepine therapy    3. Benzodiazepine withdrawal, uncomplicated (CMS/HCC)          DISPOSITION/PLAN     ED Disposition     ED Disposition Condition Comment    Discharge Stable           PATIENT REFERRED TO:  Rafi Gomez MD  [address]  [address]  [facility] [id number] [phone]    Schedule an appointment as soon as possible for a visit   Your MS/neurology specialist    Brian Lua MD  [address] [facility] [id number] [phone]    In 2 days      [facility] Emergency Department  [address] [facility] [address] [phone]    If symptoms worsen      DISCHARGE MEDICATIONS:     Medication List      START taking these medications    loperamide 2 MG capsule  Commonly known as: IMODIUM  Take 1 capsule by mouth 4 (Four) Times a Day As Needed for Diarrhea.        CHANGE how you take these medications    * clonazePAM 1 MG tablet  Commonly known as: KlonoPIN  What changed: Another medication with the same name was added. Make sure you understand how and when to take each.     * clonazePAM 1 MG tablet  Commonly known as: KlonoPIN  Take 1 tablet by mouth Daily. Taperin tablet daily x2 days, half tablet daily x2 days, then half tablet every other day  What changed: You were already taking a medication with the same name, and this prescription was added. Make sure you understand how and when to take each.         * This list has 2 medication(s) that are the same as other medications prescribed for you. Read the directions carefully, and ask your doctor or other care provider to review them with you.            CONTINUE taking these medications    amphetamine-dextroamphetamine 10 MG tablet  Commonly known as: ADDERALL     escitalopram 20 MG tablet  Commonly known as:  LEXAPRO     estradiol 0.5 MG tablet  Commonly known as: ESTRACE     gabapentin 300 MG capsule  Commonly known as: NEURONTIN  Take 2 capsules by mouth 2 (two) times a day.     Lima Root 550 MG capsule     levothyroxine 50 MCG tablet  Commonly known as: SYNTHROID, LEVOTHROID     Lidocaine 4 % patch     OCREVUS IV     omeprazole 40 MG capsule  Commonly known as: priLOSEC     ondansetron ODT 8 MG disintegrating tablet  Commonly known as: ZOFRAN-ODT     PREMARIN PO     tiZANidine 4 MG tablet  Commonly known as: ZANAFLEX     vitamin B-12 1000 MCG tablet  Commonly known as: CYANOCOBALAMIN     Vitamin D (Cholecalciferol) 25 MCG (1000 UT) capsule           Where to Get Your Medications      These medications were sent to [facility], [place] - [address] - [phone]  - [phone]   [address] [place] [address]    Phone: [phone]   · clonazePAM 1 MG tablet  · loperamide 2 MG capsule             Comment: Please note this report has been produced using speech recognition software.      Ronald Haddad DO  Attending Emergency Physician               Ronald Haddad DO  06/03/21 [id number]

## 2021-06-03 NOTE — TELEPHONE ENCOUNTER
Provider: DR MILNER    Caller: SAIMA    Relationship to Patient: SELF    Reason for Call: SAIMA IS CALLING IN STATING THAT SHE IS SICK WITH EXPLOSIVE DIARRHEA, AND CAN NOT EAT.   SHE STATES THAT DR MILNER TOOK HER OFF HER KLONOPIN THE LAST APPOINTMENT IN MAY AND SHE THINKS SHE IS HAVING WITHDRAWS.   PLEASE CALL PATIENT TO DISCUSS.

## 2021-06-10 ENCOUNTER — TELEPHONE (OUTPATIENT)
Dept: NEUROLOGY | Facility: CLINIC | Age: 51
End: 2021-06-10

## 2021-06-10 NOTE — TELEPHONE ENCOUNTER
Provider: ANNIA  Caller: MURRAY PINA/[facility] [place] [facility]  Relationship to Patient: N/A  Pharmacy: N/A  Phone Number: [phone]  Reason for Call: [facility] HAS ATTEMPTED TO CONTACT PT ON 5 DIFFERENT OCCASIONS TO COMPLETE PAPERWORK; PT EITHER IS NOT ANSWERING THE PHONE, NOT RESPONDING TO MSG LEFT OR ANSWERING THE PHONE AND IMMEDIATELY HANGING UP.    IF THE PROVIDER WANTS TO PURSUE THIS MEDICATION FOR THE PT, THE PROVIDER CAN CALL TO COMPLETE PAPERWORK OVER THE PHONE.    PLEASE ADVISE.    THANK YOU.

## 2021-08-18 RX ORDER — LAMOTRIGINE 25 MG/1
TABLET ORAL
Qty: 120 TABLET | Refills: 2 | OUTPATIENT
Start: 2021-08-18

## 2022-01-26 DIAGNOSIS — G35 MULTIPLE SCLEROSIS: ICD-10-CM

## 2022-01-27 RX ORDER — GABAPENTIN 300 MG/1
CAPSULE ORAL
Qty: 120 CAPSULE | Refills: 5 | Status: SHIPPED | OUTPATIENT
Start: 2022-01-27 | End: 2022-10-10

## 2022-03-11 ENCOUNTER — APPOINTMENT (OUTPATIENT)
Dept: ONCOLOGY | Facility: HOSPITAL | Age: 52
End: 2022-03-11

## 2022-04-08 ENCOUNTER — TELEPHONE (OUTPATIENT)
Dept: NEUROLOGY | Facility: CLINIC | Age: 52
End: 2022-04-08

## 2022-04-08 NOTE — TELEPHONE ENCOUNTER
In regards to behav health/psych referral:    Richa Vargas Stephanie S, MA FYI: Patient declined visit   Richa

## 2022-06-15 RX ORDER — LEVOTHYROXINE SODIUM 0.05 MG/1
TABLET ORAL
Qty: 90 TABLET | Refills: 0 | Status: SHIPPED | OUTPATIENT
Start: 2022-06-15 | End: 2022-09-20

## 2022-06-16 ENCOUNTER — OFFICE VISIT (OUTPATIENT)
Dept: FAMILY MEDICINE CLINIC | Facility: CLINIC | Age: 52
End: 2022-06-16

## 2022-06-16 VITALS
WEIGHT: 108 LBS | BODY MASS INDEX: 20.39 KG/M2 | HEART RATE: 67 BPM | DIASTOLIC BLOOD PRESSURE: 74 MMHG | OXYGEN SATURATION: 98 % | SYSTOLIC BLOOD PRESSURE: 104 MMHG | HEIGHT: 61 IN

## 2022-06-16 DIAGNOSIS — R73.9 HYPERGLYCEMIA: ICD-10-CM

## 2022-06-16 DIAGNOSIS — E55.9 VITAMIN D DEFICIENCY: ICD-10-CM

## 2022-06-16 DIAGNOSIS — E56.9 VITAMIN DEFICIENCY: ICD-10-CM

## 2022-06-16 DIAGNOSIS — Z78.0 MENOPAUSE: ICD-10-CM

## 2022-06-16 DIAGNOSIS — R53.83 FATIGUE, UNSPECIFIED TYPE: ICD-10-CM

## 2022-06-16 DIAGNOSIS — E03.9 HYPOTHYROIDISM, UNSPECIFIED TYPE: Primary | ICD-10-CM

## 2022-06-16 PROBLEM — R25.1 TREMOR: Status: ACTIVE | Noted: 2017-05-08

## 2022-06-16 PROCEDURE — 99214 OFFICE O/P EST MOD 30 MIN: CPT | Performed by: NURSE PRACTITIONER

## 2022-06-16 PROCEDURE — 36415 COLL VENOUS BLD VENIPUNCTURE: CPT | Performed by: NURSE PRACTITIONER

## 2022-06-16 RX ORDER — ESTRADIOL 0.5 MG/1
0.5 TABLET ORAL DAILY
Qty: 90 TABLET | Refills: 3 | Status: SHIPPED | OUTPATIENT
Start: 2022-06-16

## 2022-06-16 RX ORDER — MODAFINIL 100 MG/1
50 TABLET ORAL DAILY
COMMUNITY
Start: 2022-02-03

## 2022-06-16 NOTE — PROGRESS NOTES
"Chief Complaint  Med Refill    Subjective          Shayy Fajardo presents to [facility]  Pt is here for refills on her thyroid medication and for lab work. She has had increased fatigue and weight gain in the past few months. She ran out of her estradiol 3-4 months ago.       Objective   Vital Signs:   /74   Pulse 67   Ht 154.9 cm (61\")   Wt 49 kg (108 lb)   SpO2 98%   BMI 20.41 kg/m²     Body mass index is 20.41 kg/m².    Review of Systems   Constitutional: Positive for fatigue. Negative for fever.   Respiratory: Negative for shortness of breath.    Cardiovascular: Negative for chest pain, palpitations and leg swelling.   Neurological: Negative for syncope.   Psychiatric/Behavioral: The patient is not nervous/anxious.           Current Outpatient Medications:   •  escitalopram (LEXAPRO) 20 MG tablet, Take 10 mg by mouth Daily., Disp: , Rfl:   •  estradiol (ESTRACE) 0.5 MG tablet, Take 1 tablet by mouth Daily., Disp: 90 tablet, Rfl: 3  •  Estrogens Conjugated (PREMARIN PO), Take  by mouth 2 (Two) Times a Week., Disp: , Rfl:   •  gabapentin (NEURONTIN) 300 MG capsule, TAKE TWO CAPSULES BY MOUTH TWICE A DAY, Disp: 120 capsule, Rfl: 5  •  Ginger, Zingiber officinalis, (Ginger Root) 550 MG capsule, Take  by mouth., Disp: , Rfl:   •  levothyroxine (SYNTHROID, LEVOTHROID) 50 MCG tablet, TAKE ONE TABLET BY MOUTH DAILY, Disp: 90 tablet, Rfl: 0  •  Lidocaine 4 % patch, Apply  topically As Needed., Disp: , Rfl:   •  loperamide (IMODIUM) 2 MG capsule, Take 1 capsule by mouth 4 (Four) Times a Day As Needed for Diarrhea., Disp: 20 capsule, Rfl: 0  •  modafinil (PROVIGIL) 100 MG tablet, Take 50 mg by mouth Daily., Disp: , Rfl:   •  omeprazole (priLOSEC) 40 MG capsule, Take 1 capsule by mouth Daily., Disp: , Rfl:   •  ondansetron ODT (ZOFRAN-ODT) 8 MG disintegrating tablet, Place 1 tablet under the tongue Every 6 (Six) Hours As Needed., Disp: , Rfl:   •  tiZANidine (ZANAFLEX) 4 MG " tablet, Take 1 tablet by mouth 4 (Four) Times a Day., Disp: , Rfl:   •  Vitamin D, Cholecalciferol, 25 MCG (1000 UT) capsule, Take  by mouth., Disp: , Rfl:   •  amphetamine-dextroamphetamine (ADDERALL) 10 MG tablet, Take 1 tablet by mouth Daily., Disp: , Rfl:   •  clonazePAM (KlonoPIN) 1 MG tablet, Take 1 tablet by mouth 3 (Three) Times a Day.  , Disp: , Rfl:   •  clonazePAM (KlonoPIN) 1 MG tablet, Take 1 tablet by mouth Daily. Taperin tablet daily x2 days, half tablet daily x2 days, then half tablet every other day, Disp: 8 tablet, Rfl: 0  •  Ocrelizumab (OCREVUS IV), Infuse  into a venous catheter. Every 6 months, Disp: , Rfl:   •  vitamin B-12 (CYANOCOBALAMIN) 1000 MCG tablet, Take 1,000 mcg by mouth Daily., Disp: , Rfl:       Allergies: Macrobid [nitrofurantoin], Morphine, and Naproxen    Physical Exam  Constitutional:       Appearance: Normal appearance.   HENT:      Head: Normocephalic.   Eyes:      Conjunctiva/sclera: Conjunctivae normal.      Pupils: Pupils are equal, round, and reactive to light.   Cardiovascular:      Rate and Rhythm: Normal rate and regular rhythm.      Heart sounds: Normal heart sounds.   Pulmonary:      Effort: Pulmonary effort is normal.      Breath sounds: Normal breath sounds.   Abdominal:      Tenderness: There is no abdominal tenderness.   Musculoskeletal:         General: Normal range of motion.   Skin:     General: Skin is warm and dry.      Capillary Refill: Capillary refill takes less than 2 seconds.   Neurological:      General: No focal deficit present.      Mental Status: She is alert and oriented to person, place, and time.   Psychiatric:         Mood and Affect: Mood normal.         Behavior: Behavior normal.         Thought Content: Thought content normal.         Judgment: Judgment normal.          Result Review :                   Assessment and Plan    Diagnoses and all orders for this visit:    1. Hypothyroidism, unspecified type (Primary)  Comments:  Labs drawn.  Continue current medications.   Orders:  -     CBC & Differential; Future  -     Comprehensive Metabolic Panel; Future  -     TSH; Future  -     CBC & Differential  -     Comprehensive Metabolic Panel  -     TSH    2. Vitamin deficiency  Comments:  Labs drawn.  Orders:  -     Vitamin B12; Future  -     Folate; Future  -     Vitamin B12  -     Folate    3. Hyperglycemia  -     Hemoglobin A1c; Future  -     Hemoglobin A1c    4. Fatigue, unspecified type  Comments:  Labs drawn. Restart Estradiol. Continue current meds. Return for worsened sx.     5. Vitamin D deficiency  Comments:  Labs drawn.  Orders:  -     Vitamin D 25 Hydroxy; Future  -     Vitamin D 25 Hydroxy    6. Menopause  -     estradiol (ESTRACE) 0.5 MG tablet; Take 1 tablet by mouth Daily.  Dispense: 90 tablet; Refill: 3              BMI is within normal parameters. No other follow-up for BMI required.       Follow Up   Return in about 3 months (around 9/16/2022) for Recheck.  Patient was given instructions and counseling regarding her condition or for health maintenance advice. Please see specific information pulled into the AVS if appropriate.     FOREIGN Wen

## 2022-06-17 LAB
25(OH)D3+25(OH)D2 SERPL-MCNC: 41.8 NG/ML (ref 30–100)
ALBUMIN SERPL-MCNC: 4.8 G/DL (ref 3.8–4.8)
ALBUMIN/GLOB SERPL: 2.2 {RATIO} (ref 1.2–2.2)
ALP SERPL-CCNC: 84 IU/L (ref 44–121)
ALT SERPL-CCNC: 13 IU/L (ref 0–32)
AST SERPL-CCNC: 23 IU/L (ref 0–40)
BASOPHILS # BLD AUTO: 0.1 X10E3/UL (ref 0–0.2)
BASOPHILS NFR BLD AUTO: 1 %
BILIRUB SERPL-MCNC: 0.4 MG/DL (ref 0–1.2)
BUN SERPL-MCNC: 15 MG/DL (ref 6–24)
BUN/CREAT SERPL: 15 (ref 9–23)
CALCIUM SERPL-MCNC: 9.4 MG/DL (ref 8.7–10.2)
CHLORIDE SERPL-SCNC: 105 MMOL/L (ref 96–106)
CO2 SERPL-SCNC: 25 MMOL/L (ref 20–29)
CREAT SERPL-MCNC: 1.01 MG/DL (ref 0.57–1)
EGFRCR SERPLBLD CKD-EPI 2021: 68 ML/MIN/1.73
EOSINOPHIL # BLD AUTO: 0.2 X10E3/UL (ref 0–0.4)
EOSINOPHIL NFR BLD AUTO: 6 %
ERYTHROCYTE [DISTWIDTH] IN BLOOD BY AUTOMATED COUNT: 12.5 % (ref 11.7–15.4)
FOLATE SERPL-MCNC: 18.3 NG/ML
GLOBULIN SER CALC-MCNC: 2.2 G/DL (ref 1.5–4.5)
GLUCOSE SERPL-MCNC: 92 MG/DL (ref 65–99)
HBA1C MFR BLD: 5.6 % (ref 4.8–5.6)
HCT VFR BLD AUTO: 39.7 % (ref 34–46.6)
HGB BLD-MCNC: 13 G/DL (ref 11.1–15.9)
IMM GRANULOCYTES # BLD AUTO: 0 X10E3/UL (ref 0–0.1)
IMM GRANULOCYTES NFR BLD AUTO: 0 %
LYMPHOCYTES # BLD AUTO: 1 X10E3/UL (ref 0.7–3.1)
LYMPHOCYTES NFR BLD AUTO: 28 %
MCH RBC QN AUTO: 29.2 PG (ref 26.6–33)
MCHC RBC AUTO-ENTMCNC: 32.7 G/DL (ref 31.5–35.7)
MCV RBC AUTO: 89 FL (ref 79–97)
MONOCYTES # BLD AUTO: 0.6 X10E3/UL (ref 0.1–0.9)
MONOCYTES NFR BLD AUTO: 17 %
NEUTROPHILS # BLD AUTO: 1.7 X10E3/UL (ref 1.4–7)
NEUTROPHILS NFR BLD AUTO: 48 %
PLATELET # BLD AUTO: 312 X10E3/UL (ref 150–450)
POTASSIUM SERPL-SCNC: 5.2 MMOL/L (ref 3.5–5.2)
PROT SERPL-MCNC: 7 G/DL (ref 6–8.5)
RBC # BLD AUTO: 4.45 X10E6/UL (ref 3.77–5.28)
SODIUM SERPL-SCNC: 142 MMOL/L (ref 134–144)
TSH SERPL DL<=0.005 MIU/L-ACNC: 0.63 UIU/ML (ref 0.45–4.5)
VIT B12 SERPL-MCNC: 423 PG/ML (ref 232–1245)
WBC # BLD AUTO: 3.6 X10E3/UL (ref 3.4–10.8)

## 2022-09-20 RX ORDER — LEVOTHYROXINE SODIUM 0.05 MG/1
TABLET ORAL
Qty: 90 TABLET | Refills: 0 | Status: SHIPPED | OUTPATIENT
Start: 2022-09-20 | End: 2022-12-20 | Stop reason: SDUPTHER

## 2022-10-10 DIAGNOSIS — G35 MULTIPLE SCLEROSIS: ICD-10-CM

## 2022-10-10 RX ORDER — GABAPENTIN 300 MG/1
CAPSULE ORAL
Qty: 120 CAPSULE | Refills: 5 | Status: SHIPPED | OUTPATIENT
Start: 2022-10-10

## 2022-10-10 NOTE — TELEPHONE ENCOUNTER
Rx Refill Note  Requested Prescriptions     Pending Prescriptions Disp Refills   • gabapentin (NEURONTIN) 300 MG capsule [Pharmacy Med Name: GABAPENTIN 300 MG CAPSULE] 120 capsule      Sig: TAKE TWO CAPSULES BY MOUTH TWICE A DAY      Last filled: 1/27/22 with 5 refills pending to provider for approval.   Last office visit with prescribing clinician: 5/17/2021      Next office visit with prescribing clinician: Visit date not found     Xochitl Guzmán MA  10/10/22, 14:52 EDT

## 2022-11-11 ENCOUNTER — OFFICE VISIT (OUTPATIENT)
Dept: FAMILY MEDICINE CLINIC | Facility: CLINIC | Age: 52
End: 2022-11-11

## 2022-11-11 VITALS
DIASTOLIC BLOOD PRESSURE: 70 MMHG | HEIGHT: 61 IN | BODY MASS INDEX: 21.52 KG/M2 | SYSTOLIC BLOOD PRESSURE: 110 MMHG | HEART RATE: 63 BPM | OXYGEN SATURATION: 98 % | WEIGHT: 114 LBS

## 2022-11-11 DIAGNOSIS — B35.9 RINGWORM: Primary | ICD-10-CM

## 2022-11-11 PROCEDURE — 99213 OFFICE O/P EST LOW 20 MIN: CPT | Performed by: FAMILY MEDICINE

## 2022-11-11 RX ORDER — TERBINAFINE HYDROCHLORIDE 250 MG/1
250 TABLET ORAL DAILY
Qty: 30 TABLET | Refills: 0 | Status: SHIPPED | OUTPATIENT
Start: 2022-11-11

## 2022-11-11 NOTE — PROGRESS NOTES
Follow Up Office Visit      Date of Visit:  2022   Patient Name: Shayy Fajardo  : 1970   MRN: [id number]     Chief Complaint:    Chief Complaint   Patient presents with   • Tinea       History of Present Illness: Shayy Fajardo is a 52 y.o. female who is here today for follow up.  Patient comes in today for evaluation of a rash on the hand.  Told to come here by neurology who does her infusions for her multiple sclerosis.  She is on immunosuppressant medication.  She has a ringworm.  She has not done any Treatment to this point.        Subjective      Review of Systems:   Review of Systems   Constitutional: Negative for fatigue and fever.   HENT: Negative for congestion and ear pain.    Respiratory: Negative for apnea, cough, chest tightness and shortness of breath.    Cardiovascular: Negative for chest pain.   Gastrointestinal: Negative for abdominal pain, constipation, diarrhea and nausea.   Musculoskeletal: Negative for arthralgias.   Skin: Positive for rash.   Psychiatric/Behavioral: Negative for depressed mood and stress.       Past Medical History:   Past Medical History:   Diagnosis Date   • ADD (attention deficit disorder)    • Anxiety    • Anxiety state    • Depression    • Depressive disorder    • Developmental delay    • Difficulty walking    • Dizziness    • Ectopic pregnancy 2009   • Fainting    • Kidney stones    • Memory loss    • Menorrhagia    • Migraine    • Multiple sclerosis (HCC)    • Panic disorder     W/O AGORAPHOBIA   • Pregnancy     E1 1 , 1 C/SECTION   • Skin cancer    • Sterilization    • Stomach problems    • Thyroid disease    • Tonsillitis    • Weakness        Past Surgical History:   Past Surgical History:   Procedure Laterality Date   •  SECTION     • HYSTERECTOMY     • LAPAROTOMY OOPHERECTOMY     • SALPINGO OOPHORECTOMY  2009    OVARIAN ECTOPIC PREGNANCY   • TONSILLECTOMY AND ADENOIDECTOMY      @23 W/POSTOP.HEMORRHAGE X3, NO X-FUSIONS    • TUBAL ABDOMINAL LIGATION Bilateral        Family History:   Family History   Problem Relation Age of Onset   • Skin cancer Mother    • Leukemia Mother    • Heart disease Father    • Thyroid disease Father    • Breast cancer Maternal Aunt    • Breast cancer Maternal Grandmother        Social History:   Social History     Socioeconomic History   • Marital status:    Tobacco Use   • Smoking status: Never   • Smokeless tobacco: Never   Vaping Use   • Vaping Use: Never used   Substance and Sexual Activity   • Alcohol use: Never   • Drug use: Never       Medications:     Current Outpatient Medications:   •  escitalopram (LEXAPRO) 20 MG tablet, Take 10 mg by mouth Daily., Disp: , Rfl:   •  estradiol (ESTRACE) 0.5 MG tablet, Take 1 tablet by mouth Daily., Disp: 90 tablet, Rfl: 3  •  Estrogens Conjugated (PREMARIN PO), Take  by mouth 2 (Two) Times a Week., Disp: , Rfl:   •  gabapentin (NEURONTIN) 300 MG capsule, TAKE TWO CAPSULES BY MOUTH TWICE A DAY, Disp: 120 capsule, Rfl: 5  •  Ginger, Zingiber officinalis, (Ginger Root) 550 MG capsule, Take  by mouth., Disp: , Rfl:   •  levothyroxine (SYNTHROID, LEVOTHROID) 50 MCG tablet, TAKE ONE TABLET BY MOUTH DAILY, Disp: 90 tablet, Rfl: 0  •  Lidocaine 4 % patch, Apply  topically As Needed., Disp: , Rfl:   •  modafinil (PROVIGIL) 100 MG tablet, Take 50 mg by mouth Daily., Disp: , Rfl:   •  Ocrelizumab (OCREVUS IV), Infuse  into a venous catheter. Every 6 months, Disp: , Rfl:   •  omeprazole (priLOSEC) 40 MG capsule, Take 1 capsule by mouth Daily., Disp: , Rfl:   •  ondansetron ODT (ZOFRAN-ODT) 8 MG disintegrating tablet, Place 1 tablet under the tongue Every 6 (Six) Hours As Needed., Disp: , Rfl:   •  tiZANidine (ZANAFLEX) 4 MG tablet, Take 1 tablet by mouth 4 (Four) Times a Day., Disp: , Rfl:   •  vitamin B-12 (CYANOCOBALAMIN) 1000 MCG tablet, Take 1,000 mcg by mouth Daily., Disp: , Rfl:   •  Vitamin D, Cholecalciferol, 25 MCG (1000 UT) capsule, Take  by mouth., Disp: ,  "Rfl:   •  terbinafine (lamiSIL) 250 MG tablet, Take 1 tablet by mouth Daily., Disp: 30 tablet, Rfl: 0    Allergies:   Allergies   Allergen Reactions   • Macrobid [Nitrofurantoin] Nausea And Vomiting   • Morphine Itching   • Naproxen Nausea And Vomiting       Objective     Physical Exam:  Vital Signs:   Vitals:    11/11/22 1318   BP: 110/70   Pulse: 63   SpO2: 98%   Weight: 51.7 kg (114 lb)   Height: 154.9 cm (61\")     Body mass index is 21.54 kg/m².     Physical Exam  Vitals and nursing note reviewed.   Constitutional:       General: She is not in acute distress.     Appearance: Normal appearance. She is not ill-appearing.   HENT:      Head: Normocephalic and atraumatic.      Right Ear: Tympanic membrane and ear canal normal.      Left Ear: Tympanic membrane and ear canal normal.      Nose: Nose normal.   Cardiovascular:      Rate and Rhythm: Normal rate and regular rhythm.      Heart sounds: Normal heart sounds.   Pulmonary:      Effort: Pulmonary effort is normal.      Breath sounds: Normal breath sounds.   Skin:     Comments: Circular rash with central clearing.   Neurological:      Mental Status: She is alert and oriented to person, place, and time. Mental status is at baseline.   Psychiatric:         Mood and Affect: Mood normal.         Procedures      Assessment / Plan      Assessment/Plan:   Diagnoses and all orders for this visit:    1. Ringworm (Primary)    Other orders  -     terbinafine (lamiSIL) 250 MG tablet; Take 1 tablet by mouth Daily.  Dispense: 30 tablet; Refill: 0         Will be fairly aggressive because of the immunosuppressive medication.  Gave 1 month of Lamisil pills.  She is to get Lamisil over-the-counter cream and use it as well.    Follow Up:   No follow-ups on file.    Brian [facility] [place]   "

## 2022-12-20 ENCOUNTER — TELEPHONE (OUTPATIENT)
Dept: FAMILY MEDICINE CLINIC | Facility: CLINIC | Age: 52
End: 2022-12-20

## 2022-12-20 NOTE — TELEPHONE ENCOUNTER
Incoming Refill Request      Medication requested (name and dose):   LEVOTHYROXINE 50 MCG    [facility] where [facility] be sent[facility]    Additional details provided by patient: CONTACTED PT TO SET UP AWV, SCHEDULED TO SEE PCP 1/27/22, NEEDS REFILL ON THYROID MED UNTIL APPT.    Best call back number: [phone]    Steven Morrison Rep  12/20/22, 14:17 EST

## 2022-12-21 RX ORDER — LEVOTHYROXINE SODIUM 0.05 MG/1
50 TABLET ORAL DAILY
Qty: 90 TABLET | Refills: 0 | Status: SHIPPED | OUTPATIENT
Start: 2022-12-21 | End: 2023-03-24 | Stop reason: SDUPTHER

## 2023-03-24 RX ORDER — LEVOTHYROXINE SODIUM 0.05 MG/1
50 TABLET ORAL DAILY
Qty: 90 TABLET | Refills: 0 | Status: SHIPPED | OUTPATIENT
Start: 2023-03-24

## 2023-03-24 NOTE — TELEPHONE ENCOUNTER
Caller: Shayy Fajardo    Relationship: Self    Best call back number: [phone]    Requested Prescriptions:   Requested Prescriptions     Pending Prescriptions Disp Refills   • levothyroxine (SYNTHROID, LEVOTHROID) 50 MCG tablet 90 tablet 0     Sig: Take 1 tablet by mouth Daily.        Pharmacy where request should be sent: [facility] [id number] - [phone]  - [phone]     Last office visit with prescribing clinician: 11/11/2022   Last telemedicine visit with prescribing clinician: 4/17/2023   Next office visit with prescribing clinician: 4/17/2023       Does the patient have less than a 3 day supply:  [x] Yes  [] No    Would you like a call back once the refill request has been completed: [x] Yes [] No    If the office needs to give you a call back, can they leave a voicemail: [x] Yes [] No    Steven Villareal Rep   03/24/23 09:22 EDT

## 2023-06-01 DIAGNOSIS — G35 MULTIPLE SCLEROSIS: ICD-10-CM

## 2023-06-02 RX ORDER — GABAPENTIN 300 MG/1
CAPSULE ORAL
Qty: 360 CAPSULE | Refills: 1 | Status: SHIPPED | OUTPATIENT
Start: 2023-06-02

## 2023-06-02 NOTE — TELEPHONE ENCOUNTER
Rx Refill Note  Requested Prescriptions     Pending Prescriptions Disp Refills   • gabapentin (NEURONTIN) 300 MG capsule [Pharmacy Med Name: GABAPENTIN 300 MG CAPSULE] 120 capsule      Sig: TAKE TWO CAPSULES BY MOUTH TWICE A DAY      Last filled: 10/10/2022 w/5  Last office visit with prescribing clinician: 05/18/2022    Next office visit with prescribing clinician: last filled by Steven Arboleda  06/02/23, 08:00 EDT

## 2023-08-22 NOTE — TELEPHONE ENCOUNTER
"    [address] [facility] [address]  [place] [address]  Phone: [phone]   [phone]    Patient's PCP:Hank Weiss Called patient to let her know the changes. Patient verbalized understanding.    

## 2023-08-29 ENCOUNTER — TELEPHONE (OUTPATIENT)
Dept: NEUROLOGY | Facility: CLINIC | Age: 53
End: 2023-08-29

## 2023-08-29 NOTE — TELEPHONE ENCOUNTER
FLEX WITH [employer] CALLING TO ADVISE THAT A PRIOR AUTH IS STILL NEEDED FOR THE MAYZENT STARTER PACK    SHE SAYS TO CONTACT ESTEFANIA DIRECT AT  PH: [phone] FOR A VERBAL    IF THERE ARE ANY QUESTIONS, FLEX CAN BE REACHED AT: [phone]    THANK YOU

## 2023-08-29 NOTE — TELEPHONE ENCOUNTER
Looks like this is a [facility] pt.    Dr. Mcfarland, do you have a way of getting this message to your staff at ?    -clr

## 2023-10-09 RX ORDER — LEVOTHYROXINE SODIUM 0.05 MG/1
50 TABLET ORAL DAILY
Qty: 30 TABLET | Refills: 0 | Status: SHIPPED | OUTPATIENT
Start: 2023-10-09

## 2023-11-10 RX ORDER — LEVOTHYROXINE SODIUM 0.05 MG/1
50 TABLET ORAL DAILY
Qty: 30 TABLET | Refills: 0 | Status: SHIPPED | OUTPATIENT
Start: 2023-11-10

## 2023-12-11 ENCOUNTER — OFFICE VISIT (OUTPATIENT)
Dept: FAMILY MEDICINE CLINIC | Facility: CLINIC | Age: 53
End: 2023-12-11
Payer: MEDICARE

## 2023-12-11 VITALS
DIASTOLIC BLOOD PRESSURE: 80 MMHG | OXYGEN SATURATION: 98 % | WEIGHT: 108 LBS | HEART RATE: 76 BPM | HEIGHT: 61 IN | BODY MASS INDEX: 20.39 KG/M2 | SYSTOLIC BLOOD PRESSURE: 166 MMHG

## 2023-12-11 DIAGNOSIS — E03.9 HYPOTHYROIDISM, UNSPECIFIED TYPE: Primary | ICD-10-CM

## 2023-12-11 PROCEDURE — 99213 OFFICE O/P EST LOW 20 MIN: CPT | Performed by: FAMILY MEDICINE

## 2023-12-11 RX ORDER — LEVOTHYROXINE SODIUM 0.05 MG/1
50 TABLET ORAL DAILY
Qty: 90 TABLET | Refills: 3 | Status: SHIPPED | OUTPATIENT
Start: 2023-12-11

## 2023-12-11 NOTE — PROGRESS NOTES
Follow Up Office Visit      Date of Visit:  2023   Patient Name: Shayy Fajardo  : 1970   MRN: [id number]     Chief Complaint:    Chief Complaint   Patient presents with    Hypothyroidism       History of Present Illness: Shayy Fajardo is a 53 y.o. female who is here today for follow up.  Patient seen today to follow-up on hypothyroidism.  Currently takes levothyroxine.  We need to recheck her dosage of medication.  That situation is currently stable and controlled.  Having a great deal of issues with her multiple sclerosis.  Under specialist care currently.  They are adjusting medications.        Subjective      Review of Systems:   Review of Systems   Constitutional:  Negative for fatigue and fever.   HENT:  Negative for congestion and ear pain.    Respiratory:  Negative for apnea, cough, chest tightness and shortness of breath.    Cardiovascular:  Negative for chest pain.   Gastrointestinal:  Negative for abdominal pain, constipation, diarrhea and nausea.   Musculoskeletal:  Negative for arthralgias.   Neurological:  Positive for tremors.   Psychiatric/Behavioral:  Positive for stress. Negative for depressed mood.        Past Medical History:   Past Medical History:   Diagnosis Date    ADD (attention deficit disorder)     Anxiety     Anxiety state     Depression     Depressive disorder     Developmental delay     Difficulty walking     Dizziness     Ectopic pregnancy 2009    Fainting     Kidney stones     Memory loss     Menorrhagia     Migraine     Multiple sclerosis     Panic disorder     W/O AGORAPHOBIA    Pregnancy     E1 1 , 1 C/SECTION    Skin cancer     Sterilization     Stomach problems     Thyroid disease     Tonsillitis     Weakness        Past Surgical History:   Past Surgical History:   Procedure Laterality Date     SECTION      HYSTERECTOMY      LAPAROTOMY OOPHERECTOMY      SALPINGO OOPHORECTOMY  2009    OVARIAN ECTOPIC PREGNANCY    TONSILLECTOMY AND  ADENOIDECTOMY      @23 W/POSTOP.HEMORRHAGE X3, NO X-FUSIONS    TUBAL ABDOMINAL LIGATION Bilateral        Family History:   Family History   Problem Relation Age of Onset    Skin cancer Mother     Leukemia Mother     Heart disease Father     Thyroid disease Father     Breast cancer Maternal Aunt     Breast cancer Maternal Grandmother        Social History:   Social History     Socioeconomic History    Marital status:    Tobacco Use    Smoking status: Never    Smokeless tobacco: Never   Vaping Use    Vaping Use: Never used   Substance and Sexual Activity    Alcohol use: Never    Drug use: Never       Medications:     Current Outpatient Medications:     levothyroxine (SYNTHROID, LEVOTHROID) 50 MCG tablet, Take 1 tablet by mouth Daily., Disp: 90 tablet, Rfl: 3    escitalopram (LEXAPRO) 20 MG tablet, Take 10 mg by mouth Daily., Disp: , Rfl:     estradiol (ESTRACE) 0.5 MG tablet, Take 1 tablet by mouth Daily., Disp: 90 tablet, Rfl: 3    Estrogens Conjugated (PREMARIN PO), Take  by mouth 2 (Two) Times a Week., Disp: , Rfl:     gabapentin (NEURONTIN) 300 MG capsule, TAKE TWO CAPSULES BY MOUTH TWICE A DAY, Disp: 360 capsule, Rfl: 1    Ginger, Zingiber officinalis, (Ginger Root) 550 MG capsule, Take  by mouth., Disp: , Rfl:     Lidocaine 4 % patch, Apply  topically As Needed., Disp: , Rfl:     modafinil (PROVIGIL) 100 MG tablet, Take 50 mg by mouth Daily., Disp: , Rfl:     Ocrelizumab (OCREVUS IV), Infuse  into a venous catheter. Every 6 months, Disp: , Rfl:     omeprazole (priLOSEC) 40 MG capsule, Take 1 capsule by mouth Daily., Disp: , Rfl:     ondansetron ODT (ZOFRAN-ODT) 8 MG disintegrating tablet, Place 1 tablet under the tongue Every 6 (Six) Hours As Needed., Disp: , Rfl:     terbinafine (lamiSIL) 250 MG tablet, Take 1 tablet by mouth Daily., Disp: 30 tablet, Rfl: 0    tiZANidine (ZANAFLEX) 4 MG tablet, Take 1 tablet by mouth 4 (Four) Times a Day., Disp: , Rfl:     vitamin B-12 (CYANOCOBALAMIN) 1000 MCG tablet,  "Take 1,000 mcg by mouth Daily., Disp: , Rfl:     Vitamin D, Cholecalciferol, 25 MCG (1000 UT) capsule, Take  by mouth., Disp: , Rfl:     Allergies:   Allergies   Allergen Reactions    Macrobid [Nitrofurantoin] Nausea And Vomiting    Morphine Itching    Naproxen Nausea And Vomiting       Objective     Physical Exam:  Vital Signs:   Vitals:    12/11/23 1348   BP: 166/80   Pulse: 76   SpO2: 98%   Weight: 49 kg (108 lb)   Height: 154.9 cm (61\")     Body mass index is 20.41 kg/m².     Physical Exam  Vitals and nursing note reviewed.   Constitutional:       General: She is not in acute distress.     Appearance: Normal appearance. She is not ill-appearing.   HENT:      Head: Normocephalic and atraumatic.      Right Ear: Tympanic membrane and ear canal normal.      Left Ear: Tympanic membrane and ear canal normal.      Nose: Nose normal.   Cardiovascular:      Rate and Rhythm: Normal rate and regular rhythm.      Heart sounds: Normal heart sounds.   Pulmonary:      Effort: Pulmonary effort is normal.      Breath sounds: Normal breath sounds.   Neurological:      Mental Status: She is alert and oriented to person, place, and time. Mental status is at baseline.      Comments: Substantial tremor   Psychiatric:         Mood and Affect: Mood normal.         Procedures      Assessment / Plan      Assessment/Plan:   Diagnoses and all orders for this visit:    1. Hypothyroidism, unspecified type (Primary)  -     TSH; Future    Other orders  -     levothyroxine (SYNTHROID, LEVOTHROID) 50 MCG tablet; Take 1 tablet by mouth Daily.  Dispense: 90 tablet; Refill: 3         No changes in her thyroid medication.  I gave her orders so she can do that when she is getting blood work done with her specialist soon.    Follow Up:   No follow-ups on file.    [facility] [place]   "

## 2023-12-12 RX ORDER — LEVOTHYROXINE SODIUM 0.05 MG/1
50 TABLET ORAL DAILY
Qty: 30 TABLET | Refills: 0 | OUTPATIENT
Start: 2023-12-12

## 2024-01-03 ENCOUNTER — OFFICE VISIT (OUTPATIENT)
Dept: FAMILY MEDICINE CLINIC | Facility: CLINIC | Age: 54
End: 2024-01-03
Payer: MEDICARE

## 2024-01-03 VITALS
OXYGEN SATURATION: 98 % | SYSTOLIC BLOOD PRESSURE: 122 MMHG | TEMPERATURE: 98.2 F | BODY MASS INDEX: 20.35 KG/M2 | DIASTOLIC BLOOD PRESSURE: 78 MMHG | HEIGHT: 61 IN | WEIGHT: 107.8 LBS | HEART RATE: 79 BPM

## 2024-01-03 DIAGNOSIS — J02.9 SORE THROAT: Primary | ICD-10-CM

## 2024-01-03 DIAGNOSIS — R53.83 FATIGUE, UNSPECIFIED TYPE: ICD-10-CM

## 2024-01-03 LAB
EXPIRATION DATE: NORMAL
EXPIRATION DATE: NORMAL
FLUAV AG UPPER RESP QL IA.RAPID: NOT DETECTED
FLUBV AG UPPER RESP QL IA.RAPID: NOT DETECTED
INTERNAL CONTROL: NORMAL
INTERNAL CONTROL: NORMAL
Lab: NORMAL
Lab: NORMAL
S PYO AG THROAT QL: NEGATIVE
SARS-COV-2 AG UPPER RESP QL IA.RAPID: NOT DETECTED

## 2024-01-03 PROCEDURE — 1159F MED LIST DOCD IN RCRD: CPT | Performed by: INTERNAL MEDICINE

## 2024-01-03 PROCEDURE — 99213 OFFICE O/P EST LOW 20 MIN: CPT | Performed by: INTERNAL MEDICINE

## 2024-01-03 PROCEDURE — 87428 SARSCOV & INF VIR A&B AG IA: CPT | Performed by: INTERNAL MEDICINE

## 2024-01-03 PROCEDURE — 87880 STREP A ASSAY W/OPTIC: CPT | Performed by: INTERNAL MEDICINE

## 2024-01-03 PROCEDURE — 1160F RVW MEDS BY RX/DR IN RCRD: CPT | Performed by: INTERNAL MEDICINE

## 2024-01-03 RX ORDER — ESCITALOPRAM OXALATE 20 MG/1
20 TABLET ORAL DAILY
COMMUNITY
Start: 2023-12-12 | End: 2024-01-03

## 2024-01-03 RX ORDER — TERIFLUNOMIDE 14 MG/1
14 TABLET, FILM COATED ORAL DAILY
COMMUNITY
Start: 2023-12-20

## 2024-01-03 RX ORDER — CLONAZEPAM 0.5 MG/1
0.5 TABLET ORAL NIGHTLY PRN
COMMUNITY
Start: 2023-11-16

## 2024-01-03 NOTE — PROGRESS NOTES
Office Note     Name: Shayy Fajardo    : 1970     MRN: [id number]     Chief Complaint  Cough (Pt is here congestion, drainage, cough, sore throat and sinus pressure onset 10 days. )    Subjective     History of Present Illness:  Shayy Fajardo is a 53 y.o. female who presents today for:      runny nose cough and congestion for 10 days, with reseoning sinus congestion.   Son and  recently had strep.  Would like testing due to immunosuppresion for MS          Review of Systems:   Review of Systems    Past Medical History:   Past Medical History:   Diagnosis Date    ADD (attention deficit disorder)     Anxiety     Anxiety state     Depression     Depressive disorder     Developmental delay     Difficulty walking     Dizziness     Ectopic pregnancy 2009    Fainting     Kidney stones     Memory loss     Menorrhagia     Migraine     Multiple sclerosis     Panic disorder     W/O AGORAPHOBIA    Pregnancy     E1 1 , 1 C/SECTION    Skin cancer     Sterilization     Stomach problems     Thyroid disease     Tonsillitis     Weakness        Past Surgical History:   Past Surgical History:   Procedure Laterality Date     SECTION      HYSTERECTOMY      LAPAROTOMY OOPHERECTOMY      SALPINGO OOPHORECTOMY  2009    OVARIAN ECTOPIC PREGNANCY    TONSILLECTOMY AND ADENOIDECTOMY      @23 W/POSTOP.HEMORRHAGE X3, NO X-FUSIONS    TUBAL ABDOMINAL LIGATION Bilateral        Family History:   Family History   Problem Relation Age of Onset    Skin cancer Mother     Leukemia Mother     Heart disease Father     Thyroid disease Father     Breast cancer Maternal Aunt     Breast cancer Maternal Grandmother        Social History:   Social History     Socioeconomic History    Marital status:    Tobacco Use    Smoking status: Never    Smokeless tobacco: Never   Vaping Use    Vaping Use: Never used   Substance and Sexual Activity    Alcohol use: Never    Drug use: Never    Sexual activity: Yes      Partners: Male       Immunizations:   Immunization History   Administered Date(s) Administered    Fluzone (or Fluarix & Flulaval for VFC) >6mos 11/05/2020    Fluzone Quad >6mos (Multi-dose) 11/15/2017, 10/09/2018, 10/18/2019    Influenza Quad Vaccine (Inpatient) 10/28/2016    Influenza, Unspecified 10/18/2019        Medications:     Current Outpatient Medications:     clonazePAM (KlonoPIN) 0.5 MG tablet, Take 1 tablet by mouth At Night As Needed., Disp: , Rfl:     escitalopram (LEXAPRO) 20 MG tablet, Take 0.5 tablets by mouth Daily., Disp: , Rfl:     Estrogens Conjugated (PREMARIN PO), Take  by mouth 2 (Two) Times a Week., Disp: , Rfl:     gabapentin (NEURONTIN) 300 MG capsule, TAKE TWO CAPSULES BY MOUTH TWICE A DAY, Disp: 360 capsule, Rfl: 1    Ginger, Zingiber officinalis, (Ginger Root) 550 MG capsule, Take  by mouth., Disp: , Rfl:     levothyroxine (SYNTHROID, LEVOTHROID) 50 MCG tablet, Take 1 tablet by mouth Daily., Disp: 90 tablet, Rfl: 3    Lidocaine 4 % patch, Apply  topically As Needed., Disp: , Rfl:     modafinil (PROVIGIL) 100 MG tablet, Take 0.5 tablets by mouth Daily., Disp: , Rfl:     omeprazole (priLOSEC) 40 MG capsule, Take 1 capsule by mouth Daily., Disp: , Rfl:     ondansetron ODT (ZOFRAN-ODT) 8 MG disintegrating tablet, Place 1 tablet under the tongue Every 6 (Six) Hours As Needed., Disp: , Rfl:     terbinafine (lamiSIL) 250 MG tablet, Take 1 tablet by mouth Daily., Disp: 30 tablet, Rfl: 0    Teriflunomide 14 MG tablet, Take 14 mg by mouth Daily., Disp: , Rfl:     tiZANidine (ZANAFLEX) 4 MG tablet, Take 1 tablet by mouth 4 (Four) Times a Day., Disp: , Rfl:     vitamin B-12 (CYANOCOBALAMIN) 1000 MCG tablet, Take 1 tablet by mouth Daily., Disp: , Rfl:     Vitamin D, Cholecalciferol, 25 MCG (1000 UT) capsule, Take  by mouth., Disp: , Rfl:     Allergies:   Allergies   Allergen Reactions    Macrobid [Nitrofurantoin] Nausea And Vomiting    Morphine Itching    Naproxen Nausea And Vomiting       Objective  "    Vital Signs  /78   Pulse 79   Temp 98.2 °F (36.8 °C) (Oral)   Ht 154.9 cm (61\")   Wt 48.9 kg (107 lb 12.8 oz)   SpO2 98%   BMI 20.37 kg/m²   Estimated body mass index is 20.37 kg/m² as calculated from the following:    Height as of this encounter: 154.9 cm (61\").    Weight as of this encounter: 48.9 kg (107 lb 12.8 oz).    BMI is within normal parameters. No other follow-up for BMI required.      Physical Exam  Vitals and nursing note reviewed.   Constitutional:       General: She is not in acute distress.     Appearance: Normal appearance.   HENT:      Right Ear: Tympanic membrane normal.      Left Ear: Tympanic membrane normal.      Nose: Rhinorrhea present.      Mouth/Throat:      Pharynx: No oropharyngeal exudate or posterior oropharyngeal erythema.   Eyes:      Conjunctiva/sclera: Conjunctivae normal.   Cardiovascular:      Rate and Rhythm: Normal rate and regular rhythm.      Heart sounds: Normal heart sounds.   Pulmonary:      Effort: Pulmonary effort is normal.      Breath sounds: Normal breath sounds. No wheezing, rhonchi or rales.   Neurological:      Mental Status: She is alert.        Procedures     Assessment and Plan     1. Sore throat    - POC Rapid Strep A  - POCT SARS-CoV-2 Antigen REZA + Flu  - CBC & Differential  - Comprehensive Metabolic Panel  - Throat / Upper Respiratory Culture - Swab, Throat    2. Fatigue, unspecified type    - CBC & Differential  - Comprehensive Metabolic Panel       Follow Up  Return if symptoms worsen or fail to improve.    Patient was advised to call the office or seek medical care if  any issues discussed during this visit worsen or persist or if new concerns arise        MD SELAM Swanson [facility]  [address] [place] [id number] [phone]  "

## 2024-01-04 LAB
ALBUMIN SERPL-MCNC: 4.3 G/DL (ref 3.8–4.9)
ALBUMIN/GLOB SERPL: 2 {RATIO} (ref 1.2–2.2)
ALP SERPL-CCNC: 82 IU/L (ref 44–121)
ALT SERPL-CCNC: 14 IU/L (ref 0–32)
AST SERPL-CCNC: 20 IU/L (ref 0–40)
BASOPHILS # BLD AUTO: 0 X10E3/UL (ref 0–0.2)
BASOPHILS NFR BLD AUTO: 1 %
BILIRUB SERPL-MCNC: <0.2 MG/DL (ref 0–1.2)
BUN SERPL-MCNC: 12 MG/DL (ref 6–24)
BUN/CREAT SERPL: 13 (ref 9–23)
CALCIUM SERPL-MCNC: 9.4 MG/DL (ref 8.7–10.2)
CHLORIDE SERPL-SCNC: 107 MMOL/L (ref 96–106)
CO2 SERPL-SCNC: 26 MMOL/L (ref 20–29)
CREAT SERPL-MCNC: 0.9 MG/DL (ref 0.57–1)
EGFRCR SERPLBLD CKD-EPI 2021: 77 ML/MIN/1.73
EOSINOPHIL # BLD AUTO: 0.2 X10E3/UL (ref 0–0.4)
EOSINOPHIL NFR BLD AUTO: 7 %
ERYTHROCYTE [DISTWIDTH] IN BLOOD BY AUTOMATED COUNT: 12.2 % (ref 11.7–15.4)
GLOBULIN SER CALC-MCNC: 2.1 G/DL (ref 1.5–4.5)
GLUCOSE SERPL-MCNC: 90 MG/DL (ref 70–99)
HCT VFR BLD AUTO: 37.8 % (ref 34–46.6)
HGB BLD-MCNC: 13.2 G/DL (ref 11.1–15.9)
IMM GRANULOCYTES # BLD AUTO: 0 X10E3/UL (ref 0–0.1)
IMM GRANULOCYTES NFR BLD AUTO: 0 %
LYMPHOCYTES # BLD AUTO: 0.8 X10E3/UL (ref 0.7–3.1)
LYMPHOCYTES NFR BLD AUTO: 24 %
MCH RBC QN AUTO: 30.3 PG (ref 26.6–33)
MCHC RBC AUTO-ENTMCNC: 34.9 G/DL (ref 31.5–35.7)
MCV RBC AUTO: 87 FL (ref 79–97)
MONOCYTES # BLD AUTO: 0.6 X10E3/UL (ref 0.1–0.9)
MONOCYTES NFR BLD AUTO: 18 %
NEUTROPHILS # BLD AUTO: 1.5 X10E3/UL (ref 1.4–7)
NEUTROPHILS NFR BLD AUTO: 50 %
PLATELET # BLD AUTO: 322 X10E3/UL (ref 150–450)
POTASSIUM SERPL-SCNC: 5.2 MMOL/L (ref 3.5–5.2)
PROT SERPL-MCNC: 6.4 G/DL (ref 6–8.5)
RBC # BLD AUTO: 4.35 X10E6/UL (ref 3.77–5.28)
SODIUM SERPL-SCNC: 145 MMOL/L (ref 134–144)
TSH SERPL DL<=0.005 MIU/L-ACNC: 0.7 UIU/ML (ref 0.45–4.5)
WBC # BLD AUTO: 3.1 X10E3/UL (ref 3.4–10.8)

## 2024-01-05 LAB
BACTERIA SPEC RESP CULT: NORMAL
BACTERIA SPEC RESP CULT: NORMAL

## 2024-01-11 ENCOUNTER — TELEPHONE (OUTPATIENT)
Dept: FAMILY MEDICINE CLINIC | Facility: CLINIC | Age: 54
End: 2024-01-11
Payer: MEDICARE

## 2024-01-11 NOTE — TELEPHONE ENCOUNTER
Caller: Shayy Fajardo    Relationship: Self    Best call back number: [phone]     Caller requesting test results: YES     What test was performed: LABS    When was the test performed: 01/03/24    Where was the test performed: IN OFFICE     Additional notes: PATIENT CALLED FOR HER LAB RESULTS. SHE IS GOING TO HER NEUROLOGIST APPOINTMENT TODAY AND NEEDS THE RESULTS AND A COPY OF THEM PLEASE ADVISE         
done

## 2024-01-19 ENCOUNTER — TELEPHONE (OUTPATIENT)
Dept: FAMILY MEDICINE CLINIC | Facility: CLINIC | Age: 54
End: 2024-01-19
Payer: MEDICARE

## 2024-01-19 NOTE — TELEPHONE ENCOUNTER
Caller: Shayy Fajardo    Relationship: Self    Best call back number: [phone]     Caller requesting test results: YES     What test was performed: LABS    When was the test performed: 01/03/24    Where was the test performed: IN OFFICE - DR ORESTES DELGADILLO    Additional notes: PATIENT WAS SEEN BY DR ORESTES DELGADILLO ON 01/03/24. SHE HAD LABS DONE BUT NO ONE HAS CALLED TO PROVIDE HER LAB RESULTS. PLEASE ADVISE AND CALL PATIENT

## 2024-01-19 NOTE — TELEPHONE ENCOUNTER
PT CALLED TO CHECK STATUS FOR SOMEONE CALLING HER TO GO OVER TEST RESULTS.    PLEASE ADVISE.  CALL BACK:[phone]

## 2024-02-28 DIAGNOSIS — G35 MULTIPLE SCLEROSIS: ICD-10-CM

## 2024-02-28 RX ORDER — GABAPENTIN 300 MG/1
600 CAPSULE ORAL 2 TIMES DAILY
Qty: 360 CAPSULE | OUTPATIENT
Start: 2024-02-28

## 2024-02-28 NOTE — TELEPHONE ENCOUNTER
Seemarleny Mcfarland at       Rx Refill Note  Requested Prescriptions     Pending Prescriptions Disp Refills    gabapentin (NEURONTIN) 300 MG capsule [Pharmacy Med Name: GABAPENTIN 300 MG CAPSULE] 360 capsule      Sig: TAKE 2 CAPSULES BY MOUTH TWICE A DAY      Last filled: 06/02/2023 w/1  Last office visit with prescribing clinician: Visit date not found      Next office visit with prescribing clinician: Visit date not found     Gabrielle Hope MA  02/28/24, 12:03 EST

## 2024-04-23 ENCOUNTER — OFFICE VISIT (OUTPATIENT)
Dept: FAMILY MEDICINE CLINIC | Facility: CLINIC | Age: 54
End: 2024-04-23
Payer: MEDICARE

## 2024-04-23 VITALS
BODY MASS INDEX: 20.96 KG/M2 | HEART RATE: 84 BPM | OXYGEN SATURATION: 98 % | SYSTOLIC BLOOD PRESSURE: 100 MMHG | HEIGHT: 61 IN | WEIGHT: 111 LBS | DIASTOLIC BLOOD PRESSURE: 68 MMHG | TEMPERATURE: 97.7 F

## 2024-04-23 DIAGNOSIS — M21.941 ACQUIRED DEFORMITY OF BOTH HANDS: ICD-10-CM

## 2024-04-23 DIAGNOSIS — J30.89 SEASONAL ALLERGIC RHINITIS DUE TO OTHER ALLERGIC TRIGGER: ICD-10-CM

## 2024-04-23 DIAGNOSIS — M21.942 ACQUIRED DEFORMITY OF BOTH HANDS: ICD-10-CM

## 2024-04-23 DIAGNOSIS — H65.23 BILATERAL CHRONIC SEROUS OTITIS MEDIA: Primary | ICD-10-CM

## 2024-04-23 PROCEDURE — 99213 OFFICE O/P EST LOW 20 MIN: CPT | Performed by: PHYSICIAN ASSISTANT

## 2024-04-23 RX ORDER — IPRATROPIUM BROMIDE 21 UG/1
2 SPRAY, METERED NASAL EVERY 12 HOURS
Qty: 30 ML | Refills: 2 | Status: SHIPPED | OUTPATIENT
Start: 2024-04-23

## 2024-04-23 RX ORDER — OFATUMUMAB 20 MG/.4ML
INJECTION, SOLUTION SUBCUTANEOUS
COMMUNITY
Start: 2024-04-15

## 2024-04-23 NOTE — PROGRESS NOTES
"Chief Complaint  Earache (Lt ear pain ) and Nasal Congestion    Subjective          History of Present Illness  Shayy Fajardo is here today with earache    Patient states that her left ear is felt quite full for the past 3 days then 2 days ago she had some left ear pain as well.  She states that her allergies been going crazy for the past month.  She has had a lot of sneezing and runny nose as well as congestion.  She takes a Claritin daily for allergies.  She states that she takes no nasal sprays or decongestants.    Patient states she has had nodules on her skin around her first knuckles of both hands.  She states that she has noticed that these nodules can become painful when they are in a spot where when she is writing they rub against the pen.  She states that she really cannot feel her hands very well due to her MS diagnosis.    Objective   Vital Signs:   /68   Pulse 84   Temp 97.7 °F (36.5 °C) (Oral)   Ht 154.9 cm (61\")   Wt 50.3 kg (111 lb)   SpO2 98%   BMI 20.97 kg/m²     Body mass index is 20.97 kg/m².  BMI is within normal parameters. No other follow-up for BMI required.      Review of Systems      Current Outpatient Medications:     clonazePAM (KlonoPIN) 0.5 MG tablet, Take 1 tablet by mouth At Night As Needed., Disp: , Rfl:     escitalopram (LEXAPRO) 20 MG tablet, Take 0.5 tablets by mouth Daily., Disp: , Rfl:     Estrogens Conjugated (PREMARIN PO), Take  by mouth 2 (Two) Times a Week., Disp: , Rfl:     gabapentin (NEURONTIN) 300 MG capsule, TAKE TWO CAPSULES BY MOUTH TWICE A DAY, Disp: 360 capsule, Rfl: 1    Ginger, Zingiber officinalis, (Ginger Root) 550 MG capsule, Take  by mouth., Disp: , Rfl:     Kesimpta 20 MG/0.4ML solution auto-injector, Inject  under the skin into the appropriate area as directed Every 28 (Twenty-Eight) Days., Disp: , Rfl:     levothyroxine (SYNTHROID, LEVOTHROID) 50 MCG tablet, Take 1 tablet by mouth Daily., Disp: 90 tablet, Rfl: 3    Lidocaine 4 % patch, Apply "  topically As Needed., Disp: , Rfl:     modafinil (PROVIGIL) 100 MG tablet, Take 0.5 tablets by mouth Daily., Disp: , Rfl:     omeprazole (priLOSEC) 40 MG capsule, Take 1 capsule by mouth Daily., Disp: , Rfl:     ondansetron ODT (ZOFRAN-ODT) 8 MG disintegrating tablet, Place 1 tablet under the tongue Every 6 (Six) Hours As Needed., Disp: , Rfl:     terbinafine (lamiSIL) 250 MG tablet, Take 1 tablet by mouth Daily., Disp: 30 tablet, Rfl: 0    tiZANidine (ZANAFLEX) 4 MG tablet, Take 1 tablet by mouth 4 (Four) Times a Day., Disp: , Rfl:     vitamin B-12 (CYANOCOBALAMIN) 1000 MCG tablet, Take 1 tablet by mouth Daily., Disp: , Rfl:     Vitamin D, Cholecalciferol, 25 MCG (1000 UT) capsule, Take  by mouth., Disp: , Rfl:     ipratropium (ATROVENT) 0.03 % nasal spray, 2 sprays into the nostril(s) as directed by provider Every 12 (Twelve) Hours., Disp: 30 mL, Rfl: 2    predniSONE (DELTASONE) 20 MG tablet, 2 tab po qd x 3 days then 1 tab po qd x 3 days, Disp: 9 tablet, Rfl: 0    Teriflunomide 14 MG tablet, Take 14 mg by mouth Daily., Disp: , Rfl:     Allergies: Macrobid [nitrofurantoin], Morphine, and Naproxen    Physical Exam  Vitals and nursing note reviewed.   Constitutional:       Appearance: Normal appearance.   HENT:      Head: Normocephalic and atraumatic.      Right Ear: Ear canal and external ear normal.      Left Ear: Ear canal and external ear normal.      Ears:      Comments: Clear air-fluid levels bilaterally behind both tympanic membranes     Nose: Nose normal.      Mouth/Throat:      Mouth: Mucous membranes are moist.   Eyes:      Pupils: Pupils are equal, round, and reactive to light.   Cardiovascular:      Rate and Rhythm: Normal rate and regular rhythm.      Heart sounds: Normal heart sounds.   Pulmonary:      Effort: Pulmonary effort is normal.      Breath sounds: Normal breath sounds.   Musculoskeletal:        Hands:    Neurological:      General: No focal deficit present.   Psychiatric:         Mood and  Affect: Mood normal.          Result Review :                   Assessment and Plan    Diagnoses and all orders for this visit:    1. Bilateral chronic serous otitis media (Primary)  Will add Atrovent nasal spray to help decrease amount of fluid behind your ears.  If this is not helping over the next week or so I would have her return to our office.  We did encourage her to take her Claritin on a daily basis.  2. Acquired deformity of both hands  -     Uric acid  -     Antistreptolysin O screen  -     Rheumatoid Factor  -     C-reactive protein  -     ALEXIS  Unsure of the source of these nodules.  Will certainly rule out gout as well as other types of arthritis.  3. Seasonal allergic rhinitis due to other allergic trigger    Other orders  -     ipratropium (ATROVENT) 0.03 % nasal spray; 2 sprays into the nostril(s) as directed by provider Every 12 (Twelve) Hours.  Dispense: 30 mL; Refill: 2        Follow Up   No follow-ups on file.  Patient was given instructions and counseling regarding her condition or for health maintenance advice. Please see specific information pulled into the AVS if appropriate.     RAMESH Hassan  04/23/2024

## 2024-04-24 LAB
ANA SER QL: NEGATIVE
CRP SERPL-MCNC: 10 MG/L (ref 0–10)
RHEUMATOID FACT SERPL-ACNC: <10 IU/ML
URATE SERPL-MCNC: 4.2 MG/DL (ref 3–7.2)

## 2024-04-24 NOTE — PROGRESS NOTES
Please call patient and let her know that her labs were all negative. She does not have gout or RA- her Her labs for inflammation of her joints are also negative. Would have her follow up with PCP if they persist

## 2024-04-29 ENCOUNTER — TELEPHONE (OUTPATIENT)
Dept: FAMILY MEDICINE CLINIC | Facility: CLINIC | Age: 54
End: 2024-04-29
Payer: MEDICARE

## 2024-05-10 ENCOUNTER — OFFICE VISIT (OUTPATIENT)
Dept: FAMILY MEDICINE CLINIC | Facility: CLINIC | Age: 54
End: 2024-05-10
Payer: MEDICARE

## 2024-05-10 VITALS
SYSTOLIC BLOOD PRESSURE: 102 MMHG | HEART RATE: 60 BPM | OXYGEN SATURATION: 99 % | BODY MASS INDEX: 21.71 KG/M2 | HEIGHT: 61 IN | WEIGHT: 115 LBS | DIASTOLIC BLOOD PRESSURE: 68 MMHG

## 2024-05-10 DIAGNOSIS — H65.23 BILATERAL CHRONIC SEROUS OTITIS MEDIA: Primary | ICD-10-CM

## 2024-05-10 DIAGNOSIS — E03.9 HYPOTHYROIDISM, UNSPECIFIED TYPE: ICD-10-CM

## 2024-05-10 PROCEDURE — 99214 OFFICE O/P EST MOD 30 MIN: CPT | Performed by: PHYSICIAN ASSISTANT

## 2024-05-10 RX ORDER — PREDNISONE 20 MG/1
TABLET ORAL
Qty: 9 TABLET | Refills: 0 | Status: SHIPPED | OUTPATIENT
Start: 2024-05-10

## 2024-05-10 NOTE — PROGRESS NOTES
"Chief Complaint  Ear Fullness (Recheck ears, still having fullness since last visit. ) and Hypothyroidism (Would like to discuss rechecking thyroid)    Subjective          History of Present Illness  Shayy Fajardo is here today with ear fullness.    Patient states that she started nasal spray and her ears have been better but is still not quite enough.  She states that her ear still feel full but not necessarily painful but finds her still crackly.  She states that she has been having more headaches especially along her temple and forehead.  She states that she has been feeling lightheaded when she stands up.  She states that the room does not spin.  She states that she is gained 4 pounds in 4 days and has been feeling more fatigued as well as noticing that she is losing her hair.  She states that these are her usual signs that she needs to have her thyroid checked.    Patient states that she has been using her nasal spray once or twice a day        Objective   Vital Signs:   /68 (Patient Position: Sitting)   Pulse 60   Ht 154.9 cm (61\")   Wt 52.2 kg (115 lb)   SpO2 99%   BMI 21.73 kg/m²     Body mass index is 21.73 kg/m².  BMI is within normal parameters. No other follow-up for BMI required.      Review of Systems      Current Outpatient Medications:     clonazePAM (KlonoPIN) 0.5 MG tablet, Take 1 tablet by mouth At Night As Needed., Disp: , Rfl:     escitalopram (LEXAPRO) 20 MG tablet, Take 0.5 tablets by mouth Daily., Disp: , Rfl:     Estrogens Conjugated (PREMARIN PO), Take  by mouth 2 (Two) Times a Week., Disp: , Rfl:     gabapentin (NEURONTIN) 300 MG capsule, TAKE TWO CAPSULES BY MOUTH TWICE A DAY, Disp: 360 capsule, Rfl: 1    Ginger, Zingiber officinalis, (Ginger Root) 550 MG capsule, Take  by mouth., Disp: , Rfl:     ipratropium (ATROVENT) 0.03 % nasal spray, 2 sprays into the nostril(s) as directed by provider Every 12 (Twelve) Hours., Disp: 30 mL, Rfl: 2    Kesimpta 20 MG/0.4ML solution " auto-injector, Inject  under the skin into the appropriate area as directed Every 28 (Twenty-Eight) Days., Disp: , Rfl:     levothyroxine (SYNTHROID, LEVOTHROID) 50 MCG tablet, Take 1 tablet by mouth Daily., Disp: 90 tablet, Rfl: 3    Lidocaine 4 % patch, Apply  topically As Needed., Disp: , Rfl:     modafinil (PROVIGIL) 100 MG tablet, Take 0.5 tablets by mouth Daily., Disp: , Rfl:     omeprazole (priLOSEC) 40 MG capsule, Take 1 capsule by mouth Daily., Disp: , Rfl:     ondansetron ODT (ZOFRAN-ODT) 8 MG disintegrating tablet, Place 1 tablet under the tongue Every 6 (Six) Hours As Needed., Disp: , Rfl:     terbinafine (lamiSIL) 250 MG tablet, Take 1 tablet by mouth Daily., Disp: 30 tablet, Rfl: 0    Teriflunomide 14 MG tablet, Take 14 mg by mouth Daily., Disp: , Rfl:     tiZANidine (ZANAFLEX) 4 MG tablet, Take 1 tablet by mouth 4 (Four) Times a Day., Disp: , Rfl:     vitamin B-12 (CYANOCOBALAMIN) 1000 MCG tablet, Take 1 tablet by mouth Daily., Disp: , Rfl:     Vitamin D, Cholecalciferol, 25 MCG (1000 UT) capsule, Take  by mouth., Disp: , Rfl:     predniSONE (DELTASONE) 20 MG tablet, 2 tab po qd x 3 days then 1 tab po qd x 3 days, Disp: 9 tablet, Rfl: 0    Allergies: Macrobid [nitrofurantoin], Morphine, and Naproxen    Physical Exam  Vitals and nursing note reviewed.   Constitutional:       Appearance: Normal appearance.   HENT:      Head: Normocephalic and atraumatic.      Right Ear: Ear canal and external ear normal.      Left Ear: Ear canal and external ear normal.      Ears:      Comments: Clear air-fluid levels behind both tympanic membranes     Nose: Congestion present.      Mouth/Throat:      Mouth: Mucous membranes are moist.      Comments: Clear postnasal drainage  Eyes:      Pupils: Pupils are equal, round, and reactive to light.   Cardiovascular:      Rate and Rhythm: Normal rate and regular rhythm.      Heart sounds: Normal heart sounds.   Pulmonary:      Effort: Pulmonary effort is normal.      Breath  sounds: Normal breath sounds.   Neurological:      General: No focal deficit present.      Mental Status: She is alert.   Psychiatric:         Mood and Affect: Mood normal.         Behavior: Behavior normal.        Result Review :                   Assessment and Plan    Diagnoses and all orders for this visit:    1. Bilateral chronic serous otitis media (Primary)  -     predniSONE (DELTASONE) 20 MG tablet; 2 tab po qd x 3 days then 1 tab po qd x 3 days  Dispense: 9 tablet; Refill: 0  Will place patient on prednisone to help with fluid behind your ears.  Will also have her use her nasal spray 2-3 times a day consistently and see if that does not help as well.  2. Hypothyroidism, unspecified type  -     TSH  -     T4, free  We will draw labs today due to symptoms.      Follow Up   No follow-ups on file.  Patient was given instructions and counseling regarding her condition or for health maintenance advice. Please see specific information pulled into the AVS if appropriate.     RAMESH Hassan  05/10/2024

## 2024-05-11 LAB
T4 FREE SERPL-MCNC: 0.98 NG/DL (ref 0.82–1.77)
TSH SERPL DL<=0.005 MIU/L-ACNC: 0.57 UIU/ML (ref 0.45–4.5)

## 2024-05-13 NOTE — PROGRESS NOTES
Please call patient and let her know that her thyroid levels/function are normal. How is she feeling?

## 2024-05-16 RX ORDER — PREDNISONE 20 MG/1
TABLET ORAL
Qty: 9 TABLET | Refills: 0 | OUTPATIENT
Start: 2024-05-16

## 2024-05-16 NOTE — TELEPHONE ENCOUNTER
Please call patient and let her know that I cannot keep her on prednisone. If her ears are still bothering her she is to make an an appt rachel with PCP

## 2024-06-14 NOTE — TELEPHONE ENCOUNTER
Discharge criteria met per galo score. Will continue to monitor patient here in PACU for phase 2 and discharge patient later.   Notified patient to stop taking dalfampridine. She asked what should she do with the rest of the pills. I advised patient to contact her pharmacists and see what they recommend, I told her I know we cannot take them. Patient verbalized understanding.

## 2024-08-01 ENCOUNTER — OFFICE VISIT (OUTPATIENT)
Dept: FAMILY MEDICINE CLINIC | Facility: CLINIC | Age: 54
End: 2024-08-01
Payer: MEDICARE

## 2024-08-01 VITALS
OXYGEN SATURATION: 96 % | DIASTOLIC BLOOD PRESSURE: 72 MMHG | HEIGHT: 61 IN | WEIGHT: 112 LBS | HEART RATE: 62 BPM | SYSTOLIC BLOOD PRESSURE: 106 MMHG | BODY MASS INDEX: 21.14 KG/M2

## 2024-08-01 DIAGNOSIS — J01.10 ACUTE NON-RECURRENT FRONTAL SINUSITIS: Primary | ICD-10-CM

## 2024-08-01 DIAGNOSIS — H65.23 BILATERAL CHRONIC SEROUS OTITIS MEDIA: ICD-10-CM

## 2024-08-01 DIAGNOSIS — R05.1 ACUTE COUGH: ICD-10-CM

## 2024-08-01 PROBLEM — S32.10XA FRACTURE OF SACRUM: Status: ACTIVE | Noted: 2019-09-11

## 2024-08-01 PROBLEM — M79.10 MUSCLE PAIN: Status: ACTIVE | Noted: 2017-09-21

## 2024-08-01 PROCEDURE — 99214 OFFICE O/P EST MOD 30 MIN: CPT | Performed by: PHYSICIAN ASSISTANT

## 2024-08-01 RX ORDER — BENZONATATE 200 MG/1
200 CAPSULE ORAL 3 TIMES DAILY PRN
Qty: 30 CAPSULE | Refills: 0 | Status: SHIPPED | OUTPATIENT
Start: 2024-08-01 | End: 2024-08-11

## 2024-08-01 RX ORDER — CEPHALEXIN 500 MG/1
500 CAPSULE ORAL 2 TIMES DAILY
Qty: 14 CAPSULE | Refills: 0 | Status: SHIPPED | OUTPATIENT
Start: 2024-08-01 | End: 2024-08-08

## 2024-08-01 NOTE — PROGRESS NOTES
"Chief Complaint  Cough (Cough for 3 weeks, has consistent pain in left ear)    Subjective        Shayy Fajardo presents to [facility]  History of Present Illness  .  History of Present Illness  The patient presents for evaluation of cough and ear pain.    She has been experiencing a cough, initially attributing it to allergies. However, when she blows her nose, she experiences severe pain in her left ear. The pain is less severe than before, but it intensifies slightly when she blows her nose. She also reports a headache. A home COVID-19 test returned negative results, and she does not believe she has COVID-19. She suspects her symptoms are allergy-related, as her sinuses are causing her throat to hurt and drain. Despite taking over-the-counter medications, her symptoms have not improved. She has been using a nasal spray daily and taking Zyrtec for her allergies. She had an ear infection about a month ago, which took some time to take effect.    Supplemental Information  She has MS. She has damage from C5 to her thoracic area because of her MS. She takes ibuprofen 800 mg for the pain. She exercises for her MS. She tries to move her legs in her pool. Her fingers are swollen.      Objective   Vital Signs:  /72   Pulse 62   Ht 154.9 cm (61\")   Wt 50.8 kg (112 lb)   SpO2 96%   BMI 21.16 kg/m²   Estimated body mass index is 21.16 kg/m² as calculated from the following:    Height as of this encounter: 154.9 cm (61\").    Weight as of this encounter: 50.8 kg (112 lb).       BMI is within normal parameters. No other follow-up for BMI required.      Physical Exam  Vitals and nursing note reviewed.   Constitutional:       Appearance: Normal appearance.   HENT:      Head: Normocephalic and atraumatic.      Right Ear: Ear canal and external ear normal.      Left Ear: Ear canal and external ear normal.      Ears:      Comments: Clear air-fluid levels behind both tympanic membranes     " Nose: Congestion present.      Mouth/Throat:      Mouth: Mucous membranes are moist.      Comments: Clear postnasal drainage  Eyes:      Pupils: Pupils are equal, round, and reactive to light.   Cardiovascular:      Rate and Rhythm: Normal rate and regular rhythm.      Heart sounds: Normal heart sounds.   Pulmonary:      Effort: Pulmonary effort is normal.      Breath sounds: Normal breath sounds.   Neurological:      General: No focal deficit present.      Mental Status: She is alert.   Psychiatric:         Mood and Affect: Mood normal.         Behavior: Behavior normal.        Result Review :            .  Assessment & Plan               Assessment and Plan     Diagnoses and all orders for this visit:    1. Acute non-recurrent frontal sinusitis (Primary)  -     cephalexin (Keflex) 500 MG capsule; Take 1 capsule by mouth 2 (Two) Times a Day for 7 days.  Dispense: 14 capsule; Refill: 0    2. Acute cough  -     benzonatate (TESSALON) 200 MG capsule; Take 1 capsule by mouth 3 (Three) Times a Day As Needed for Cough for up to 10 days.  Dispense: 30 capsule; Refill: 0    3. Bilateral chronic serous otitis media    Continue current allergy medications including nasal sprays    Princess CARRASQUILLO student  ..I saw and evaluated the patient, participating in the key portions of the service.  I reviewed the medical student note.  I agree with the student's findings and plan.          Follow Up     No follow-ups on file.  Patient was given instructions and counseling regarding her condition or for health maintenance advice. Please see specific information pulled into the AVS if appropriate.

## 2024-09-10 ENCOUNTER — OFFICE VISIT (OUTPATIENT)
Dept: FAMILY MEDICINE CLINIC | Facility: CLINIC | Age: 54
End: 2024-09-10
Payer: MEDICARE

## 2024-09-10 VITALS
HEIGHT: 61 IN | HEART RATE: 57 BPM | DIASTOLIC BLOOD PRESSURE: 70 MMHG | OXYGEN SATURATION: 97 % | WEIGHT: 111 LBS | SYSTOLIC BLOOD PRESSURE: 110 MMHG | BODY MASS INDEX: 20.96 KG/M2

## 2024-09-10 DIAGNOSIS — H92.02 LEFT EAR PAIN: Primary | ICD-10-CM

## 2024-09-10 DIAGNOSIS — H61.22 IMPACTED CERUMEN OF LEFT EAR: ICD-10-CM

## 2024-09-10 PROCEDURE — 99213 OFFICE O/P EST LOW 20 MIN: CPT | Performed by: NURSE PRACTITIONER

## 2024-09-10 PROCEDURE — 1160F RVW MEDS BY RX/DR IN RCRD: CPT | Performed by: NURSE PRACTITIONER

## 2024-09-10 PROCEDURE — 1159F MED LIST DOCD IN RCRD: CPT | Performed by: NURSE PRACTITIONER

## 2024-09-10 PROCEDURE — 69210 REMOVE IMPACTED EAR WAX UNI: CPT | Performed by: NURSE PRACTITIONER

## 2024-09-10 NOTE — PROGRESS NOTES
Office Note     Name: Shayy Fajardo    : 1970     MRN: [id number]     Chief Complaint  Earache (Pt has alyx having left ear pain for a couple of days now but has gotten worse )    Subjective     History of Present Illness:  Shayy Fajardo is a 54 y.o. female who presents today for  History of Present Illness  The patient presents for evaluation of left ear pain.    She reports experiencing pain in her left ear, a recurring issue that has presented itself three times over the past couple of months. She also mentions hearing popping and crackling sounds in her ear. Her current symptoms began three days ago, accompanied by a low-grade fever of 99.9 degrees, nasal congestion, and difficulty breathing. She experiences mild discomfort in her right ear, but it is significantly less severe than the pain in her left ear. She reports no throat pain or drainage.    Her last visit to this office was at the start of 2024, during which she was prescribed Keflex for a sinus infection. This treatment alleviated her symptoms, but approximately two to three weeks later, she began experiencing ear pain again. She believes the Keflex effectively managed her sinus drainage, but did not resolve her ear-related issues. During her first visit, she was given a nasal spray.    She has a history of multiple sclerosis (MS).      Objective     Past Medical History:   Diagnosis Date    ADD (attention deficit disorder)     Anxiety     Anxiety state     Depression     Depressive disorder     Developmental delay     Difficulty walking     Dizziness     Ectopic pregnancy 2009    Fainting     Kidney stones     Memory loss     Menorrhagia     Migraine     Multiple sclerosis     Panic disorder     W/O AGORAPHOBIA    Pregnancy     E1 1 , 1 C/SECTION    Skin cancer     Sterilization     Stomach problems     Thyroid disease     Tonsillitis     Weakness      Past Surgical History:   Procedure Laterality Date      "SECTION      HYSTERECTOMY      LAPAROTOMY OOPHERECTOMY      SALPINGO OOPHORECTOMY  01/2009    OVARIAN ECTOPIC PREGNANCY    TONSILLECTOMY AND ADENOIDECTOMY      @23 W/POSTOP.HEMORRHAGE X3, NO X-FUSIONS    TUBAL ABDOMINAL LIGATION Bilateral      Family History   Problem Relation Age of Onset    Skin cancer Mother     Leukemia Mother     Heart disease Father     Thyroid disease Father     Breast cancer Maternal Aunt     Breast cancer Maternal Grandmother        Vital Signs  /70 (BP Location: Left arm, Patient Position: Sitting, Cuff Size: Adult)   Pulse 57   Ht 154.9 cm (61\")   Wt 50.3 kg (111 lb)   SpO2 97%   BMI 20.97 kg/m²   Estimated body mass index is 20.97 kg/m² as calculated from the following:    Height as of this encounter: 154.9 cm (61\").    Weight as of this encounter: 50.3 kg (111 lb).    Physical Exam  Vitals reviewed.   Constitutional:       Appearance: Normal appearance.   HENT:      Head: Normocephalic.      Right Ear: Tympanic membrane, ear canal and external ear normal.      Left Ear: External ear normal. There is impacted cerumen.      Nose: Nose normal.      Mouth/Throat:      Mouth: Mucous membranes are moist.      Pharynx: Oropharynx is clear.   Cardiovascular:      Rate and Rhythm: Normal rate and regular rhythm.      Heart sounds: Normal heart sounds.   Pulmonary:      Effort: Pulmonary effort is normal.      Breath sounds: Normal breath sounds.   Skin:     General: Skin is warm and dry.      Capillary Refill: Capillary refill takes less than 2 seconds.   Neurological:      General: No focal deficit present.      Mental Status: She is alert and oriented to person, place, and time.   Psychiatric:         Mood and Affect: Mood normal.         Behavior: Behavior normal.        Physical Exam  Left ear has a solid wall of wax, eardrum is not visible. Throat appears normal.  Lungs sound normal.  Heart sounds normal.      Results          Ear Cerumen Removal    Date/Time: 9/10/2024 1:26 " PM    Performed by: Itzel Lieberman MA  Authorized by: Dianna Moore APRN    Anesthesia:  Local Anesthetic: none  Location details: left ear  Patient tolerance: patient tolerated the procedure well with no immediate complications  Procedure type: instrumentation, irrigation   Sedation:  Patient sedated: no            Assessment and Plan     Diagnoses and all orders for this visit:    1. Left ear pain (Primary)    2. Impacted cerumen of left ear  -     Ear Cerumen Removal      Assessment & Plan  1. Left ear pain.  The patient reports recurrent left ear pain over the past few months, with the current episode starting about three days ago. Symptoms include low-grade fever (99.9°F), runny nose, nasal congestion.. The ear pain is associated with popping and crackling noises. Examination revealed a solid wall of wax in the left ear, preventing visualization of the eardrum. The earwax will be removed by Itzel, after which the ear will be re-examined to check for any underlying infection.    2. Cerumen impaction, left ear.  The patient has a significant buildup of earwax in the left ear, which is likely contributing to her symptoms. Itzel will perform earwax removal. After the procedure, the ear will be re-examined to ensure the wax is completely removed and to rule out any infection behind the impaction.      Entire removal of cerumen was not successful due to patient's discomfort.  Patient was offered the option of doing cerumen softening products at home and then return to the office at a later date for second attempt at full removal, for which she was agreeable.  FOREIGN did not go back in to reassess for underlying infection, as this would not have been successful since very minimal cerumen was removed.      Follow Up  Return if symptoms worsen or fail to improve.    Patient or patient representative verbalized consent for the use of Ambient Listening during the visit with  FOREIGN Bernstein for chart  documentation. 9/10/2024  13:27 EDT    Dianna Moore APRN

## 2024-10-21 ENCOUNTER — OFFICE VISIT (OUTPATIENT)
Dept: FAMILY MEDICINE CLINIC | Facility: CLINIC | Age: 54
End: 2024-10-21
Payer: MEDICARE

## 2024-10-21 VITALS
HEART RATE: 62 BPM | HEIGHT: 61 IN | SYSTOLIC BLOOD PRESSURE: 122 MMHG | OXYGEN SATURATION: 96 % | DIASTOLIC BLOOD PRESSURE: 70 MMHG | WEIGHT: 108 LBS | BODY MASS INDEX: 20.39 KG/M2

## 2024-10-21 DIAGNOSIS — M25.571 ACUTE RIGHT ANKLE PAIN: Primary | ICD-10-CM

## 2024-10-21 DIAGNOSIS — M25.532 WRIST PAIN, LEFT: ICD-10-CM

## 2024-10-21 PROCEDURE — 1159F MED LIST DOCD IN RCRD: CPT | Performed by: NURSE PRACTITIONER

## 2024-10-21 PROCEDURE — 1160F RVW MEDS BY RX/DR IN RCRD: CPT | Performed by: NURSE PRACTITIONER

## 2024-10-21 PROCEDURE — 99214 OFFICE O/P EST MOD 30 MIN: CPT | Performed by: NURSE PRACTITIONER

## 2024-10-21 RX ORDER — PREDNISONE 20 MG/1
TABLET ORAL
Qty: 18 TABLET | Refills: 0 | Status: SHIPPED | OUTPATIENT
Start: 2024-10-21

## 2024-10-21 RX ORDER — METRONIDAZOLE 500 MG/1
TABLET ORAL
COMMUNITY
Start: 2024-10-07

## 2024-10-21 NOTE — PROGRESS NOTES
"Chief Complaint  Ankle Pain (Right ankle pain and left wrist pain started yesterday )    Subjective          Shayy Fajardo presents to [facility]  History of Present Illness  Pt has had pain/swelling in her right ankle and left wrist since yesterday. She denies known injury.   Ankle Pain         History of Present Illness      Objective   Vital Signs:   /70   Pulse 62   Ht 154.9 cm (61\")   Wt 49 kg (108 lb)   SpO2 96%   BMI 20.41 kg/m²     Body mass index is 20.41 kg/m².    Review of Systems   Constitutional:  Negative for fatigue and fever.   Respiratory:  Negative for shortness of breath.    Cardiovascular:  Negative for chest pain, palpitations and leg swelling.   Musculoskeletal:  Positive for arthralgias.   Neurological:  Negative for syncope.   Psychiatric/Behavioral:  The patient is not nervous/anxious.           Current Outpatient Medications:     clonazePAM (KlonoPIN) 0.5 MG tablet, Take 1 tablet by mouth At Night As Needed., Disp: , Rfl:     escitalopram (LEXAPRO) 20 MG tablet, Take 0.5 tablets by mouth Daily., Disp: , Rfl:     gabapentin (NEURONTIN) 300 MG capsule, TAKE TWO CAPSULES BY MOUTH TWICE A DAY, Disp: 360 capsule, Rfl: 1    Ginger, Zingiber officinalis, (Ginger Root) 550 MG capsule, Take  by mouth., Disp: , Rfl:     ipratropium (ATROVENT) 0.03 % nasal spray, 2 sprays into the nostril(s) as directed by provider Every 12 (Twelve) Hours., Disp: 30 mL, Rfl: 2    Kesimpta 20 MG/0.4ML solution auto-injector, Inject  under the skin into the appropriate area as directed Every 28 (Twenty-Eight) Days., Disp: , Rfl:     levothyroxine (SYNTHROID, LEVOTHROID) 50 MCG tablet, Take 1 tablet by mouth Daily., Disp: 90 tablet, Rfl: 3    Lidocaine 4 % patch, Apply  topically As Needed., Disp: , Rfl:     metroNIDAZOLE (FLAGYL) 500 MG tablet, , Disp: , Rfl:     modafinil (PROVIGIL) 100 MG tablet, Take 0.5 tablets by mouth Daily., Disp: , Rfl:     omeprazole (priLOSEC) 40 " MG capsule, Take 1 capsule by mouth Daily., Disp: , Rfl:     ondansetron ODT (ZOFRAN-ODT) 8 MG disintegrating tablet, Place 1 tablet under the tongue Every 6 (Six) Hours As Needed., Disp: , Rfl:     tiZANidine (ZANAFLEX) 4 MG tablet, Take 1 tablet by mouth 4 (Four) Times a Day., Disp: , Rfl:     vitamin B-12 (CYANOCOBALAMIN) 1000 MCG tablet, Take 1 tablet by mouth Daily., Disp: , Rfl:     Vitamin D, Cholecalciferol, 25 MCG (1000 UT) capsule, Take  by mouth., Disp: , Rfl:     predniSONE (DELTASONE) 20 MG tablet, 3 QD x 3 days, 2 QD x 3 days, 1 QD x 3 days, Disp: 18 tablet, Rfl: 0      Allergies: Morphine, Naproxen, and Nitrofurantoin    Physical Exam  Constitutional:       Appearance: Normal appearance.   HENT:      Head: Normocephalic.   Eyes:      Conjunctiva/sclera: Conjunctivae normal.      Pupils: Pupils are equal, round, and reactive to light.   Cardiovascular:      Rate and Rhythm: Normal rate and regular rhythm.      Heart sounds: Normal heart sounds.   Pulmonary:      Effort: Pulmonary effort is normal.      Breath sounds: Normal breath sounds.   Abdominal:      Tenderness: There is no abdominal tenderness.   Musculoskeletal:         General: Normal range of motion.      Comments: Tenderness right lateral ankle and left diffuse wrist. Edema noted.    Skin:     General: Skin is warm and dry.      Capillary Refill: Capillary refill takes less than 2 seconds.   Neurological:      General: No focal deficit present.      Mental Status: She is alert and oriented to person, place, and time.   Psychiatric:         Mood and Affect: Mood normal.         Behavior: Behavior normal.         Thought Content: Thought content normal.         Judgment: Judgment normal.          Physical Exam         Result Review :                Results            Assessment and Plan    Diagnoses and all orders for this visit:    1. Acute right ankle pain (Primary)  Comments:  Finish prednisone. Elevate when possible. Labs and XRs ordered.  RTC for worsened sx and if not improving in 1 week.  Orders:  -     XR Ankle 3+ View Right; Future  -     CBC & Differential  -     Uric Acid  -     ALEXIS  -     Rheumatoid Factor  -     predniSONE (DELTASONE) 20 MG tablet; 3 QD x 3 days, 2 QD x 3 days, 1 QD x 3 days  Dispense: 18 tablet; Refill: 0    2. Wrist pain, left  Comments:  Finish prednisone. Elevate when possible. Labs and XRs ordered. RTC for worsened sx and if not improving in 1 week.  Orders:  -     XR Wrist 3+ View Left; Future  -     CBC & Differential  -     Uric Acid  -     ALEXIS  -     Rheumatoid Factor  -     predniSONE (DELTASONE) 20 MG tablet; 3 QD x 3 days, 2 QD x 3 days, 1 QD x 3 days  Dispense: 18 tablet; Refill: 0        Assessment & Plan                   Follow Up   Return in about 1 week (around 10/28/2024) for if not improving or sooner if symptoms worsen.  Patient was given instructions and counseling regarding her condition or for health maintenance advice. Please see specific information pulled into the AVS if appropriate.     FOREIGN Wen

## 2024-10-22 LAB
ANA SER QL: NEGATIVE
BASOPHILS # BLD AUTO: 0.1 X10E3/UL (ref 0–0.2)
BASOPHILS NFR BLD AUTO: 1 %
EOSINOPHIL # BLD AUTO: 0.2 X10E3/UL (ref 0–0.4)
EOSINOPHIL NFR BLD AUTO: 4 %
ERYTHROCYTE [DISTWIDTH] IN BLOOD BY AUTOMATED COUNT: 12.4 % (ref 11.7–15.4)
HCT VFR BLD AUTO: 38.9 % (ref 34–46.6)
HGB BLD-MCNC: 12.2 G/DL (ref 11.1–15.9)
IMM GRANULOCYTES # BLD AUTO: 0 X10E3/UL (ref 0–0.1)
IMM GRANULOCYTES NFR BLD AUTO: 0 %
LYMPHOCYTES # BLD AUTO: 1.3 X10E3/UL (ref 0.7–3.1)
LYMPHOCYTES NFR BLD AUTO: 28 %
MCH RBC QN AUTO: 28.3 PG (ref 26.6–33)
MCHC RBC AUTO-ENTMCNC: 31.4 G/DL (ref 31.5–35.7)
MCV RBC AUTO: 90 FL (ref 79–97)
MONOCYTES # BLD AUTO: 0.5 X10E3/UL (ref 0.1–0.9)
MONOCYTES NFR BLD AUTO: 11 %
NEUTROPHILS # BLD AUTO: 2.7 X10E3/UL (ref 1.4–7)
NEUTROPHILS NFR BLD AUTO: 56 %
PLATELET # BLD AUTO: 339 X10E3/UL (ref 150–450)
RBC # BLD AUTO: 4.31 X10E6/UL (ref 3.77–5.28)
RHEUMATOID FACT SERPL-ACNC: <10 IU/ML
URATE SERPL-MCNC: 3.4 MG/DL (ref 3–7.2)
WBC # BLD AUTO: 4.7 X10E3/UL (ref 3.4–10.8)

## 2024-10-23 ENCOUNTER — TELEPHONE (OUTPATIENT)
Dept: FAMILY MEDICINE CLINIC | Facility: CLINIC | Age: 54
End: 2024-10-23

## 2024-10-23 NOTE — TELEPHONE ENCOUNTER
Spoke with patient. Relayed provider message. Pt voiced understanding and states that she is feeling better.

## 2024-10-23 NOTE — TELEPHONE ENCOUNTER
Will you let know: Your Xrs did not show fractures but they saw degenerative changes. Your labs were normal and did not show gout or rheumatoid arthritis. How are your symptoms?

## 2024-10-28 ENCOUNTER — TELEPHONE (OUTPATIENT)
Dept: FAMILY MEDICINE CLINIC | Facility: CLINIC | Age: 54
End: 2024-10-28
Payer: MEDICARE

## 2024-10-28 NOTE — TELEPHONE ENCOUNTER
Caller: Shayy Fajardo     Relationship: [unfilled]     Best call back number: [phone]     What is your medical concern? PATIENT HAS COMPLETED STEROIDS AND RIGHT ANKLE AND LEFT WRIST HAS SWOLLEN AGAIN AND PATIENT IS EXPERIENCING PAIN, PLEASE CALL PATIENT TO FURTHER DISCUSS POTENTIAL NEXT STEP OF TREATMENT.

## 2024-10-28 NOTE — TELEPHONE ENCOUNTER
Progress Note, Physician


Chief Complaint: 





sob


History of Present Illness: 





yest was very sob.


urinated a lot after iv lasix yest, same today.


still sob but much better today.





no cp, palp, swelling





- Current Medication List


Current Medications: 


Active Medications





Acetaminophen (Tylenol -)  650 mg PO Q6H PRN


   PRN Reason: MODERATE PAIN (4-7)


Albuterol Sulfate (Ventolin 0.083% Nebulizer Soln -)  1 amp NEB Q4H PRN


   PRN Reason: SHORT OF BREATH/WHEEZING


Albuterol/Ipratropium (Duoneb -)  1 amp NEB RQID [facility]


   Last Admin: 01/31/20 20:06 Dose:  1 amp


Anastrozole (Arimidex -)  1 mg PO DAILY [facility]


   Last Admin: 01/31/20 11:02 Dose:  1 mg


Aspirin (Asa -)  81 mg PO DAILY [facility]


   Last Admin: 01/31/20 11:02 Dose:  81 mg


Atorvastatin Calcium (Lipitor -)  10 mg PO HS [facility]


   Last Admin: 01/31/20 21:52 Dose:  10 mg


Clotrimazole (Lotrisone Cream (Small Tube))  1 applic TP BID [facility]


   Last Admin: 01/31/20 21:54 Dose:  Not Given


Diltiazem HCl (Cardizem -)  30 mg PO Q6HPO [facility]


   Last Admin: 02/01/20 06:12 Dose:  30 mg


Furosemide (Lasix Injection -)  40 mg IVPUSH DAILY [facility]


Gabapentin (Neurontin -)  300 mg PO TID [facility]


   Last Admin: 02/01/20 06:12 Dose:  300 mg


Guaifenesin (Diabetic Tussin Dm -)  10 ml PO Q4H [facility]


   Last Admin: 02/01/20 06:13 Dose:  10 ml


Heparin Sodium (Porcine) (Heparin -)  5,000 unit SQ BID [facility]


   Last Admin: 01/31/20 21:52 Dose:  5,000 unit


Hydrochlorothiazide (Hctz -)  12.5 mg PO DAILY [facility]


   Last Admin: 01/30/20 10:10 Dose:  12.5 mg


Ceftriaxone Sodium 1 gm/ (Dextrose)  50 mls @ 100 mls/hr IVPB DAILY [facility]


   Last Admin: 01/31/20 11:03 Dose:  100 mls/hr


Azithromycin (Zithromax 500mg Ivpb (Pre-Docked))  500 mg in 250 mls @ 250 mls/

hr IVPB DAILY [facility]


   Last Admin: 01/31/20 12:13 Dose:  250 mls/hr


Levothyroxine Sodium (Synthroid -)  150 mcg PO AM [facility]


   Last Admin: 02/01/20 06:12 Dose:  150 mcg


Losartan Potassium (Cozaar -)  50 mg PO DAILY [facility]


   Last Admin: 01/31/20 11:02 Dose:  50 mg


Methylprednisolone Sodium Succinate (Solu-Medrol -)  40 mg IVPUSH Q8H-IV [facility]


   Last Admin: 02/01/20 01:19 Dose:  40 mg


Multi-Ingredient Ointment (Zinc Oxide)  1 applic TP BID [facility]


   Last Admin: 01/31/20 21:53 Dose:  Not Given











- Objective


Vital Signs: 


 Vital Signs











Temperature  98.3 F   02/01/20 07:00


 


Pulse Rate  80   02/01/20 07:00


 


Respiratory Rate  18   02/01/20 07:00


 


Blood Pressure  149/74   02/01/20 07:00


 


O2 Sat by Pulse Oximetry (%)  95   01/31/20 21:00











Constitutional: Yes: No Distress, Calm, Obese


Cardiovascular: Yes: Regular Rate and Rhythm, S1, S2.  No: JVD (in WC), Gallop, 

Murmur


Respiratory: Yes: Regular, Wheezes.  No: Accessory Muscle Use


Extremities: No: Cold


Edema: Yes (1+ ankles)


Neurological: Yes: Alert, Oriented


Psychiatric: No: Agitated


Labs: 


 CBC, BMP





 01/29/20 05:35 





 02/01/20 07:10 











Assessment/Plan





ECG x2: probable sinus with frequent APCs. RBBB, LAFB. + long QT. no path q's. 

nonsp ST-Ts--vs priors: the APCs are new, QT previously prolonged





Echo 1/20: borderline EF 50%, mild LW hypo. RV normal. mild-mod MR. RVSP 30-40





tele: sinus, 1 run PSVT




















fever:


-CXR clear, UA unremarkable. BCX pending


-plan per ID





CHF: Volume overloaded


-1/31 CXR pulm edema, bilat effusions, new vs prior--started IV lasix 40 qd


-renal fxn overall stable and at her baseline. diuresing well, sob improving.


-cont lasix 40 iv qd, standing weights ordered


-echo preserved EF, valve fxn unremarkable, mild pulm HTN


-lateral hypokinesis noted--rec pharm MPI prior to discharge





PSVT


-brief runs on tele


-started on diltiazem for prophylaxis (avoiding b-b given active bronchospasm 

in copd pt)





prolonged QT:


-not a new finding (present on prior ECG 8/19, > 500 msec then as well).


-? effect of cilostazol (known offender with torsades risk associated) or 

Symbicort (formoterol component can prolong QT)--both meds presently being held


-K, Mag optimized


-repeat ECG difficult to measure QT as t wave runs into next p wave, however 

appears to still be prolonged, though likely improved vs prior (machine reading 

QTc 480s)--? congenital channelopathy


-cont lyte optimization, avoidance of QT prolonging meds





KITTY:


-improved


-baseline 1.4-1.6


-likely sepsis, +/- hypovolemia





copd:


-+ wheezing


-plan per pulm





periph vascular dz, venous ins'y:


-followed by wound center at [place]


-rx'd cilostazol for severe resting LE pains--sx much improved


-holding this med here sec to QT prolongation--monitor for claudication sx's





mult sclerosis:


-per primary





htn:


-overall stable


-same meds, observe trend. Saw Justine on the 21st, her note says to return in 1 week if not better, so needs an appt please

## 2024-10-30 ENCOUNTER — OFFICE VISIT (OUTPATIENT)
Dept: FAMILY MEDICINE CLINIC | Facility: CLINIC | Age: 54
End: 2024-10-30
Payer: MEDICARE

## 2024-10-30 VITALS
WEIGHT: 106 LBS | HEIGHT: 61 IN | HEART RATE: 80 BPM | BODY MASS INDEX: 20.01 KG/M2 | SYSTOLIC BLOOD PRESSURE: 124 MMHG | DIASTOLIC BLOOD PRESSURE: 68 MMHG | OXYGEN SATURATION: 100 %

## 2024-10-30 DIAGNOSIS — M25.50 ARTHRALGIA, UNSPECIFIED JOINT: Primary | ICD-10-CM

## 2024-10-30 DIAGNOSIS — M25.532 LEFT WRIST PAIN: ICD-10-CM

## 2024-10-30 DIAGNOSIS — M25.571 ACUTE RIGHT ANKLE PAIN: ICD-10-CM

## 2024-10-30 PROCEDURE — 1159F MED LIST DOCD IN RCRD: CPT | Performed by: NURSE PRACTITIONER

## 2024-10-30 PROCEDURE — 99213 OFFICE O/P EST LOW 20 MIN: CPT | Performed by: NURSE PRACTITIONER

## 2024-10-30 PROCEDURE — 1160F RVW MEDS BY RX/DR IN RCRD: CPT | Performed by: NURSE PRACTITIONER

## 2024-10-30 RX ORDER — MELOXICAM 7.5 MG/1
7.5 TABLET ORAL DAILY
Qty: 90 TABLET | Refills: 1 | Status: SHIPPED | OUTPATIENT
Start: 2024-10-30

## 2024-10-30 RX ORDER — METHYLPREDNISOLONE 4 MG/1
TABLET ORAL
Qty: 21 TABLET | Refills: 0 | Status: SHIPPED | OUTPATIENT
Start: 2024-10-30

## 2024-10-30 NOTE — PROGRESS NOTES
"Chief Complaint  Joint Swelling (Lt wrist and rt ankle) and Joint Pain    Subjective          Shayy Fajardo presents to [facility]  History of Present Illness  Pt has had pain and swelling in her left wrist and right ankle for the past several weeks. She was seen and had labs and Xrs done. Her symptoms improved with the prednisone but returned when this was completed.   Joint Swelling  Associated symptoms include arthralgias and joint swelling. Pertinent negatives include no chest pain, fatigue or fever.   Joint Pain  Associated symptoms include arthralgias and joint swelling. Pertinent negatives include no chest pain, fatigue or fever.       History of Present Illness      Objective   Vital Signs:   /68   Pulse 80   Ht 154.9 cm (61\")   Wt 48.1 kg (106 lb)   SpO2 100%   BMI 20.03 kg/m²     Body mass index is 20.03 kg/m².    Review of Systems   Constitutional:  Negative for fatigue and fever.   Respiratory:  Negative for shortness of breath.    Cardiovascular:  Negative for chest pain, palpitations and leg swelling.   Musculoskeletal:  Positive for arthralgias and joint swelling.   Neurological:  Negative for syncope.   Psychiatric/Behavioral:  The patient is not nervous/anxious.           Current Outpatient Medications:     clonazePAM (KlonoPIN) 0.5 MG tablet, Take 1 tablet by mouth At Night As Needed., Disp: , Rfl:     escitalopram (LEXAPRO) 20 MG tablet, Take 0.5 tablets by mouth Daily., Disp: , Rfl:     gabapentin (NEURONTIN) 300 MG capsule, TAKE TWO CAPSULES BY MOUTH TWICE A DAY, Disp: 360 capsule, Rfl: 1    Ginger, Zingiber officinalis, (Ginger Root) 550 MG capsule, Take  by mouth., Disp: , Rfl:     ipratropium (ATROVENT) 0.03 % nasal spray, 2 sprays into the nostril(s) as directed by provider Every 12 (Twelve) Hours., Disp: 30 mL, Rfl: 2    Kesimpta 20 MG/0.4ML solution auto-injector, Inject  under the skin into the appropriate area as directed Every 28 " (Twenty-Eight) Days., Disp: , Rfl:     levothyroxine (SYNTHROID, LEVOTHROID) 50 MCG tablet, Take 1 tablet by mouth Daily., Disp: 90 tablet, Rfl: 3    Lidocaine 4 % patch, Apply  topically As Needed., Disp: , Rfl:     metroNIDAZOLE (FLAGYL) 500 MG tablet, , Disp: , Rfl:     modafinil (PROVIGIL) 100 MG tablet, Take 0.5 tablets by mouth Daily., Disp: , Rfl:     omeprazole (priLOSEC) 40 MG capsule, Take 1 capsule by mouth Daily., Disp: , Rfl:     ondansetron ODT (ZOFRAN-ODT) 8 MG disintegrating tablet, Place 1 tablet under the tongue Every 6 (Six) Hours As Needed., Disp: , Rfl:     tiZANidine (ZANAFLEX) 4 MG tablet, Take 1 tablet by mouth 4 (Four) Times a Day., Disp: , Rfl:     vitamin B-12 (CYANOCOBALAMIN) 1000 MCG tablet, Take 1 tablet by mouth Daily., Disp: , Rfl:     Vitamin D, Cholecalciferol, 25 MCG (1000 UT) capsule, Take  by mouth., Disp: , Rfl:     meloxicam (Mobic) 7.5 MG tablet, Take 1 tablet by mouth Daily., Disp: 90 tablet, Rfl: 1    methylPREDNISolone (MEDROL) 4 MG dose pack, Take as directed on package instructions., Disp: 21 tablet, Rfl: 0      Allergies: Morphine, Naproxen, and Nitrofurantoin    Physical Exam  Constitutional:       Appearance: Normal appearance.   HENT:      Head: Normocephalic.   Eyes:      Conjunctiva/sclera: Conjunctivae normal.      Pupils: Pupils are equal, round, and reactive to light.   Cardiovascular:      Rate and Rhythm: Normal rate and regular rhythm.      Heart sounds: Normal heart sounds.   Pulmonary:      Effort: Pulmonary effort is normal.      Breath sounds: Normal breath sounds.   Abdominal:      Tenderness: There is no abdominal tenderness.   Musculoskeletal:         General: Normal range of motion.      Comments: Tenderness left wrist/right lateral ankle. Mild edema.   Skin:     General: Skin is warm and dry.      Capillary Refill: Capillary refill takes less than 2 seconds.   Neurological:      General: No focal deficit present.      Mental Status: She is alert and  oriented to person, place, and time.   Psychiatric:         Mood and Affect: Mood normal.         Behavior: Behavior normal.         Thought Content: Thought content normal.         Judgment: Judgment normal.          Physical Exam         Result Review :                Results            Assessment and Plan    Diagnoses and all orders for this visit:    1. Arthralgia, unspecified joint (Primary)  Comments:  Finish Medrol dose pack and then start Mobic daily. Elevate when possible. We will refer to ortho if not improving.  Orders:  -     methylPREDNISolone (MEDROL) 4 MG dose pack; Take as directed on package instructions.  Dispense: 21 tablet; Refill: 0  -     meloxicam (Mobic) 7.5 MG tablet; Take 1 tablet by mouth Daily.  Dispense: 90 tablet; Refill: 1    2. Left wrist pain    3. Acute right ankle pain                  Follow Up   Return in about 1 week (around 11/6/2024) for if not improving or sooner if symptoms worsen.  Patient was given instructions and counseling regarding her condition or for health maintenance advice. Please see specific information pulled into the AVS if appropriate.     [facility]

## 2024-11-07 ENCOUNTER — TELEPHONE (OUTPATIENT)
Dept: FAMILY MEDICINE CLINIC | Facility: CLINIC | Age: 54
End: 2024-11-07
Payer: MEDICARE

## 2024-11-07 DIAGNOSIS — M25.50 ARTHRALGIA, UNSPECIFIED JOINT: Primary | ICD-10-CM

## 2024-11-07 DIAGNOSIS — M25.571 ACUTE RIGHT ANKLE PAIN: ICD-10-CM

## 2024-11-07 DIAGNOSIS — M25.532 LEFT WRIST PAIN: ICD-10-CM

## 2024-11-07 NOTE — TELEPHONE ENCOUNTER
Caller: Shayy Fajardo    Relationship: Self    Best call back number: [phone]     What is the medical concern/diagnosis: DEGENERATION CHANGES BOTH ANKLES AND WRISTS WITH SWELLING. PAIN    What specialty or service is being requested: ORTHO    What is the provider, practice or medical service name: [facility]    What is the office location: [place]    What is the office phone number: ?    Any additional details: PT NEEDS REFERRAL TO ORTHO. CAN'T DEAL WITH PAIN ANYMORE.

## 2024-11-11 ENCOUNTER — OFFICE VISIT (OUTPATIENT)
Dept: ORTHOPEDIC SURGERY | Facility: CLINIC | Age: 54
End: 2024-11-11
Payer: MEDICARE

## 2024-11-11 VITALS
SYSTOLIC BLOOD PRESSURE: 120 MMHG | HEIGHT: 61 IN | DIASTOLIC BLOOD PRESSURE: 70 MMHG | BODY MASS INDEX: 20.58 KG/M2 | WEIGHT: 109 LBS

## 2024-11-11 DIAGNOSIS — M25.40 SWELLING OF MULTIPLE JOINTS: Primary | ICD-10-CM

## 2024-11-11 PROCEDURE — 1160F RVW MEDS BY RX/DR IN RCRD: CPT | Performed by: STUDENT IN AN ORGANIZED HEALTH CARE EDUCATION/TRAINING PROGRAM

## 2024-11-11 PROCEDURE — 1159F MED LIST DOCD IN RCRD: CPT | Performed by: STUDENT IN AN ORGANIZED HEALTH CARE EDUCATION/TRAINING PROGRAM

## 2024-11-11 PROCEDURE — 99203 OFFICE O/P NEW LOW 30 MIN: CPT | Performed by: STUDENT IN AN ORGANIZED HEALTH CARE EDUCATION/TRAINING PROGRAM

## 2024-11-11 RX ORDER — ONDANSETRON 4 MG/1
4 TABLET, ORALLY DISINTEGRATING ORAL
COMMUNITY
Start: 2024-10-30

## 2024-11-11 RX ORDER — CELECOXIB 200 MG/1
200 CAPSULE ORAL DAILY
Qty: 30 CAPSULE | Refills: 5 | Status: SHIPPED | OUTPATIENT
Start: 2024-11-11

## 2024-11-11 RX ORDER — METHYLPREDNISOLONE 4 MG/1
1 TABLET ORAL DAILY
Qty: 21 TABLET | Refills: 0 | Status: SHIPPED | OUTPATIENT
Start: 2024-11-11

## 2024-11-11 RX ORDER — ESTRADIOL 0.1 MG/G
CREAM VAGINAL
COMMUNITY
Start: 2024-10-30

## 2024-11-11 NOTE — PROGRESS NOTES
[facility]     Office Visit       Date: 11/11/2024   Patient Name: Shayy Fajardo  MRN: [id number]  YOB: 1970    Referring Physician: Justine Jansen APRN     Chief Complaint:   Chief Complaint   Patient presents with    Left Wrist - Edema, Pain, Initial Evaluation    Right Wrist - Pain, Initial Evaluation, Edema     History of Present Illness:   Shayy Fajardo is a 54 y.o. female right-hand-dominant presented clinic complaints of bilateral wrist and right ankle pain.  The patient reports several weeks ago she developed left wrist and right ankle pain.  There is associated swelling.  No injury or trauma reported.  Symptoms were present for approximately 3 weeks and then improved with a course of bracing and oral steroids.  She now reports that her right wrist is more bothersome than her left.  Symptoms are similar with associated pain and swelling.  No known injury or trauma.  She denies any numbness or tingling.  Patient has a known history of multiple sclerosis and hypothyroidism.  No known personal or family history of rheumatoid arthritis.    Subjective   Review of Systems:   Review of Systems   HENT: Negative.     Eyes: Negative.    Respiratory: Negative.     Cardiovascular: Negative.    Gastrointestinal: Negative.    Endocrine: Negative.    Genitourinary: Negative.    Musculoskeletal:  Positive for arthralgias.   Allergic/Immunologic: Negative.    Hematological: Negative.    Psychiatric/Behavioral: Negative.        Pertinent review of systems per HPI    I reviewed the patient's chief complaint, history of present illness, review of systems, past medical history, surgical history, family history, social history, medications and allergy list in the EMR on 11/11/2024 and agree with the findings above.    Objective    Vital Signs:   Vitals:    11/11/24 1515   BP: 120/70   Weight: 49.4 kg (109 lb)   Height:  "154.9 cm (61\")     BMI: BMI is within normal parameters. No other follow-up for BMI required.     General: No acute distress. Alert and oriented.   Cardiovascular: Palpable radial pulse.   Respiratory: Breathing is nonlabored.     Ortho Exam:  Examination of bilateral upper extremities demonstrates mild/moderate swelling diffusely to the right wrist and mild left wrist swelling.  No skin lesions or abrasions.  No deformity.  No atrophy or wasting.  She is diffusely tender to palpation about the right wrist that is worse dorsally.  There is no overlying erythema or warmth.  Mildly tender to the left dorsal radiocarpal joint.  Painless passive short arc range of motion.  Pain noted with extremes of motion.  She is able to make a composite fist.  Sensations grossly intact light touch throughout the median and ulnar nerve distribution of the hand.  Warm and well-perfused distally.    Imaging / Studies:    Imaging Results (Last 24 Hours)       ** No results found for the last 24 hours. **        Left wrist x-rays obtained on 10/21/2024 personally viewed interpreted by myself.  These demonstrate no evidence of acute fracture or dislocation.  Assessment / Plan    Assessment/Plan:   Shayy Fajardo is a 54 y.o. female with bilateral wrist swelling.     I discussed with the patient their clinical and radiographic findings demonstrate bilateral wrist swelling of unknown etiology.  We had a lengthy discussion regarding the pathophysiology of their diagnosis.  Patient's x-rays demonstrate no acute findings. 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  She has had negative rheumatoid factors in the past, however has not been evaluated by a rheumatologist.  There is no surgical treatment that can be offered at this time.  I will provide her a Medrol Dosepak and Celebrex.  She may continue to use her wrist braces as needed.  She may follow-up with me as needed.  They " were agreeable with the plan.  All questions and concerns were addressed.       ICD-10-CM ICD-9-CM   1. Swelling of multiple joints  M25.40 719.09     Follow Up:   Return if symptoms worsen or fail to improve.      Kaylee Luna MD  [facility]

## 2024-11-12 DIAGNOSIS — M25.50 POLYARTHRALGIA: Primary | ICD-10-CM

## 2024-11-20 ENCOUNTER — TELEPHONE (OUTPATIENT)
Dept: FAMILY MEDICINE CLINIC | Facility: CLINIC | Age: 54
End: 2024-11-20

## 2024-11-20 ENCOUNTER — TELEPHONE (OUTPATIENT)
Dept: ORTHOPEDIC SURGERY | Facility: CLINIC | Age: 54
End: 2024-11-20
Payer: MEDICARE

## 2024-11-20 NOTE — TELEPHONE ENCOUNTER
Patient called to schedule an appointment for numbness in both hands, weakness, unbalanced, and headaches spoke with Renu regarding patient's symptoms let patient know Renu would call her back regarding her symptoms

## 2024-11-20 NOTE — TELEPHONE ENCOUNTER
Called patient and let her know unfortunately  did not have any further recommendations for rheumatology. I let her know she could check with her PCP to see if they have further recommendations. Pt verbalized understanding.    Sho Heredia CMA (AAMA)

## 2024-11-20 NOTE — TELEPHONE ENCOUNTER
"ROBIN    Received a call from the patient stating the referral submitted by provider MATTHEW for rheumatology at [facility] ( Julius Sprague DO) will not have anything available until 3/11/2025, which is too long of a wait with what she is dealing with. Per patient, she is experiencing too much of an \"intense pain, which is consistent for most part, for both hands (left being more intense).\" Patient would like to know if there is an alternative facility / clinic that can be recommended, so that she can get in sooner. Patient states she is contemplating going to the ER, due to the pain, and it taking her 4-5 times a day to try to \"wake hands up from numbness and pain.\" Patient is kindly requesting for someone to give her a follow up call. Please advise.     Thank you kindly!   "

## 2024-11-20 NOTE — TELEPHONE ENCOUNTER
Caller: Scotty, Shayy    Relationship: Self    Best call back number: [phone]     What is the medical concern/diagnosis: HAND AND WRIST PAIN, AND LEFT ANKLE PAIN     What specialty or service is being requested: RHEUMATOLOGY     What is the provider, practice or medical service name: [facility] IN NETWORK WITH HER INSURANCE     What is the office location: [place]     What is the office phone number: [place]     Any additional details: THE PATIENT REPORTS THAT SHE SAW THE ORTHO DOCTOR THAT [facility] REFERRED HER TO AND STATED THAT ORTHO REFERRED TO RHEUMATOLOGY. HOWEVER, THE PATIENT STATED SHE CANNOT GET INTO RHEUMATOLOGY UNTIL MARCH 2025, AND WAS ADVISED  BY ORTHO TO CONTACT PCP TO REQUEST A SOONER REFERRAL RHEUMATOLOGY.    PLEASE CALL AND ADVISE.

## 2024-12-02 RX ORDER — LEVOTHYROXINE SODIUM 50 UG/1
50 TABLET ORAL DAILY
Qty: 60 TABLET | Refills: 0 | Status: SHIPPED | OUTPATIENT
Start: 2024-12-02

## 2024-12-02 NOTE — TELEPHONE ENCOUNTER
Sounds like it would be better for her to see her neurologist for these complaints as she has balance issue and ext. Numbness.

## 2024-12-31 ENCOUNTER — OFFICE VISIT (OUTPATIENT)
Dept: FAMILY MEDICINE CLINIC | Facility: CLINIC | Age: 54
End: 2024-12-31
Payer: MEDICARE

## 2024-12-31 VITALS
DIASTOLIC BLOOD PRESSURE: 94 MMHG | WEIGHT: 103 LBS | SYSTOLIC BLOOD PRESSURE: 140 MMHG | HEART RATE: 88 BPM | BODY MASS INDEX: 19.45 KG/M2 | OXYGEN SATURATION: 99 % | HEIGHT: 61 IN

## 2024-12-31 DIAGNOSIS — J01.90 ACUTE NON-RECURRENT SINUSITIS, UNSPECIFIED LOCATION: Primary | ICD-10-CM

## 2024-12-31 DIAGNOSIS — J01.10 ACUTE NON-RECURRENT FRONTAL SINUSITIS: ICD-10-CM

## 2024-12-31 PROCEDURE — 99213 OFFICE O/P EST LOW 20 MIN: CPT | Performed by: FAMILY MEDICINE

## 2024-12-31 RX ORDER — DOXYCYCLINE 100 MG/1
100 CAPSULE ORAL 2 TIMES DAILY
Qty: 20 CAPSULE | Refills: 0 | Status: SHIPPED | OUTPATIENT
Start: 2024-12-31

## 2024-12-31 RX ORDER — DEXTROMETHORPHAN HYDROBROMIDE AND PROMETHAZINE HYDROCHLORIDE 15; 6.25 MG/5ML; MG/5ML
5 SYRUP ORAL 4 TIMES DAILY PRN
Qty: 120 ML | Refills: 0 | Status: SHIPPED | OUTPATIENT
Start: 2024-12-31

## 2024-12-31 RX ORDER — CEPHALEXIN 500 MG/1
CAPSULE ORAL
Qty: 14 CAPSULE | Refills: 0 | OUTPATIENT
Start: 2024-12-31

## 2024-12-31 NOTE — PROGRESS NOTES
Follow Up Office Visit      Date of Visit:  2024   Patient Name: Shayy Fajardo  : 1970   MRN: [id number]     Chief Complaint:  No chief complaint on file.      History of Present Illness: Shayy Fajardo is a 54 y.o. female who is here today for follow up.    History of Present Illness  The patient is a 54-year-old female who presents for evaluation of upper respiratory symptoms.    She has been experiencing illness since the previous Wednesday, initially presenting with myalgia and fever. Subsequently, she developed postnasal drainage, a severe cough, and diarrhea. Despite the resolution of her fever, she continues to experience chest congestion and a persistent cough. She has not been utilizing DayQuil or NyQuil for symptom management. A COVID-19 test conducted yielded a negative result.      Subjective      Review of Systems:   Review of Systems   Constitutional:  Positive for fatigue. Negative for fever.   HENT:  Positive for congestion. Negative for ear pain.    Respiratory:  Positive for cough. Negative for apnea, chest tightness and shortness of breath.    Cardiovascular:  Negative for chest pain.   Gastrointestinal:  Negative for abdominal pain, constipation, diarrhea and nausea.   Musculoskeletal:  Negative for arthralgias.   Psychiatric/Behavioral:  Negative for depressed mood and stress.        Past Medical History:   Past Medical History:   Diagnosis Date    ADD (attention deficit disorder)     Anxiety     Anxiety state     Depression     Depressive disorder     Developmental delay     Difficulty walking     Dizziness     Ectopic pregnancy 2009    Fainting     Kidney stones     Memory loss     Menorrhagia     Migraine     Multiple sclerosis     Panic disorder     W/O AGORAPHOBIA    Pregnancy     E1 1 , 1 C/SECTION    Skin cancer     Sterilization     Stomach problems     Thyroid disease     Tonsillitis     Weakness        Past Surgical History:   Past Surgical History:    Procedure Laterality Date     SECTION      HYSTERECTOMY      LAPAROTOMY OOPHERECTOMY      SALPINGO OOPHORECTOMY  2009    OVARIAN ECTOPIC PREGNANCY    TONSILLECTOMY AND ADENOIDECTOMY      @23 W/POSTOP.HEMORRHAGE X3, NO X-FUSIONS    TUBAL ABDOMINAL LIGATION Bilateral        Family History:   Family History   Problem Relation Age of Onset    Skin cancer Mother     Leukemia Mother     Heart disease Father     Thyroid disease Father     Breast cancer Maternal Aunt     Breast cancer Maternal Grandmother        Social History:   Social History     Socioeconomic History    Marital status:    Tobacco Use    Smoking status: Never     Passive exposure: Past    Smokeless tobacco: Never   Vaping Use    Vaping status: Never Used   Substance and Sexual Activity    Alcohol use: Never    Drug use: Never    Sexual activity: Yes     Partners: Male       Medications:     Current Outpatient Medications:     celecoxib (CeleBREX) 200 MG capsule, Take 1 capsule by mouth Daily., Disp: 30 capsule, Rfl: 5    clonazePAM (KlonoPIN) 0.5 MG tablet, Take 1 tablet by mouth At Night As Needed., Disp: , Rfl:     doxycycline (VIBRAMYCIN) 100 MG capsule, Take 1 capsule by mouth 2 (Two) Times a Day., Disp: 20 capsule, Rfl: 0    escitalopram (LEXAPRO) 20 MG tablet, Take 0.5 tablets by mouth Daily., Disp: , Rfl:     estradiol (ESTRACE) 0.1 MG/GM vaginal cream, , Disp: , Rfl:     gabapentin (NEURONTIN) 300 MG capsule, TAKE TWO CAPSULES BY MOUTH TWICE A DAY, Disp: 360 capsule, Rfl: 1    Ginger, Zingiber officinalis, (Ginger Root) 550 MG capsule, Take  by mouth., Disp: , Rfl:     ipratropium (ATROVENT) 0.03 % nasal spray, 2 sprays into the nostril(s) as directed by provider Every 12 (Twelve) Hours., Disp: 30 mL, Rfl: 2    Kesimpta 20 MG/0.4ML solution auto-injector, Inject  under the skin into the appropriate area as directed Every 28 (Twenty-Eight) Days., Disp: , Rfl:     levothyroxine (SYNTHROID, LEVOTHROID) 50 MCG tablet, TAKE 1 TABLET  "BY MOUTH DAILY, Disp: 60 tablet, Rfl: 0    Lidocaine 4 % patch, Apply  topically As Needed., Disp: , Rfl:     meloxicam (Mobic) 7.5 MG tablet, Take 1 tablet by mouth Daily., Disp: 90 tablet, Rfl: 1    methylPREDNISolone (MEDROL) 4 MG dose pack, Take as directed on package instructions., Disp: 21 tablet, Rfl: 0    methylPREDNISolone (MEDROL) 4 MG dose pack, Take 1 tablet by mouth Daily. Use as directed by package instructions, Disp: 21 tablet, Rfl: 0    metroNIDAZOLE (FLAGYL) 500 MG tablet, , Disp: , Rfl:     modafinil (PROVIGIL) 100 MG tablet, Take 0.5 tablets by mouth Daily., Disp: , Rfl:     omeprazole (priLOSEC) 40 MG capsule, Take 1 capsule by mouth Daily., Disp: , Rfl:     ondansetron ODT (ZOFRAN-ODT) 4 MG disintegrating tablet, Place 1 tablet on the tongue., Disp: , Rfl:     promethazine-dextromethorphan (PROMETHAZINE-DM) 6.25-15 MG/5ML syrup, Take 5 mL by mouth 4 (Four) Times a Day As Needed for Cough., Disp: 120 mL, Rfl: 0    tiZANidine (ZANAFLEX) 4 MG tablet, Take 1 tablet by mouth 4 (Four) Times a Day., Disp: , Rfl:     vitamin B-12 (CYANOCOBALAMIN) 1000 MCG tablet, Take 1 tablet by mouth Daily., Disp: , Rfl:     Vitamin D, Cholecalciferol, 25 MCG (1000 UT) capsule, Take  by mouth., Disp: , Rfl:     Allergies:   Allergies   Allergen Reactions    Morphine Itching    Naproxen Nausea And Vomiting    Nitrofurantoin Nausea And Vomiting and Unknown (See Comments)       Objective     Physical Exam:  Vital Signs:   Vitals:    12/31/24 1149   BP: 140/94   Pulse: 88   SpO2: 99%   Weight: 46.7 kg (103 lb)   Height: 154.9 cm (61\")     Body mass index is 19.46 kg/m².     Physical Exam  Vitals and nursing note reviewed.   Constitutional:       General: She is not in acute distress.     Appearance: Normal appearance. She is not ill-appearing.   HENT:      Head: Normocephalic and atraumatic.      Right Ear: Tympanic membrane and ear canal normal.      Left Ear: Tympanic membrane and ear canal normal.      Nose: Nose " normal.   Cardiovascular:      Rate and Rhythm: Normal rate and regular rhythm.      Heart sounds: Normal heart sounds.   Pulmonary:      Effort: Pulmonary effort is normal.      Breath sounds: Normal breath sounds.   Neurological:      Mental Status: She is alert and oriented to person, place, and time. Mental status is at baseline.   Psychiatric:         Mood and Affect: Mood normal.       Physical Exam  Lungs were auscultated.    Procedures    Results    Assessment / Plan      Assessment/Plan:   Diagnoses and all orders for this visit:    1. Acute non-recurrent sinusitis, unspecified location (Primary)    Other orders  -     doxycycline (VIBRAMYCIN) 100 MG capsule; Take 1 capsule by mouth 2 (Two) Times a Day.  Dispense: 20 capsule; Refill: 0  -     promethazine-dextromethorphan (PROMETHAZINE-DM) 6.25-15 MG/5ML syrup; Take 5 mL by mouth 4 (Four) Times a Day As Needed for Cough.  Dispense: 120 mL; Refill: 0       Assessment & Plan  1. Upper respiratory infection.  The patient's symptoms suggest a viral etiology, potentially progressing to a secondary infection such as sinusitis or bronchitis. She reports body aches, fever, drainage, a severe cough, and diarrhea. The fever has resolved, but she continues to experience chest congestion and a persistent cough. There is no evidence of pneumonia or severe bronchitis with wheezing upon examination. Doxycycline 100 mg twice daily has been prescribed to address the potential secondary infection. She is advised to continue using DayQuil and NyQuil for symptom relief. If the cough persists despite these measures, Phenergan DM will be prescribed. She is encouraged to maintain adequate rest and hydration to facilitate mucus thinning. At this stage, testing for influenza or COVID-19 is not deemed necessary.        Follow Up:   No follow-ups on file.    [facility] [place]     Patient or patient representative verbalized consent for the use of Ambient  Listening during the visit with  Brian Lua MD for chart documentation. 12/31/2024  12:38 EST

## 2025-01-27 DIAGNOSIS — M25.571 ACUTE RIGHT ANKLE PAIN: ICD-10-CM

## 2025-01-27 DIAGNOSIS — M25.532 WRIST PAIN, LEFT: ICD-10-CM

## 2025-01-28 RX ORDER — PREDNISONE 20 MG/1
TABLET ORAL
Qty: 18 TABLET | Refills: 0 | OUTPATIENT
Start: 2025-01-28

## 2025-02-03 RX ORDER — LEVOTHYROXINE SODIUM 50 UG/1
50 TABLET ORAL DAILY
Qty: 90 TABLET | Refills: 0 | Status: SHIPPED | OUTPATIENT
Start: 2025-02-03

## 2025-03-31 RX ORDER — DEXTROMETHORPHAN HYDROBROMIDE AND PROMETHAZINE HYDROCHLORIDE 15; 6.25 MG/5ML; MG/5ML
SYRUP ORAL
Qty: 120 ML | Refills: 0 | OUTPATIENT
Start: 2025-03-31

## 2025-04-07 ENCOUNTER — OFFICE VISIT (OUTPATIENT)
Dept: FAMILY MEDICINE CLINIC | Facility: CLINIC | Age: 55
End: 2025-04-07
Payer: MEDICARE

## 2025-04-07 VITALS
SYSTOLIC BLOOD PRESSURE: 140 MMHG | DIASTOLIC BLOOD PRESSURE: 94 MMHG | HEIGHT: 61 IN | BODY MASS INDEX: 19.63 KG/M2 | WEIGHT: 104 LBS | HEART RATE: 61 BPM | OXYGEN SATURATION: 96 %

## 2025-04-07 DIAGNOSIS — G35 MULTIPLE SCLEROSIS: Chronic | ICD-10-CM

## 2025-04-07 DIAGNOSIS — H93.19 TINNITUS, UNSPECIFIED LATERALITY: ICD-10-CM

## 2025-04-07 DIAGNOSIS — Z80.0 FAMILY HISTORY OF COLON CANCER: ICD-10-CM

## 2025-04-07 DIAGNOSIS — E03.9 HYPOTHYROIDISM, UNSPECIFIED TYPE: ICD-10-CM

## 2025-04-07 DIAGNOSIS — Z00.00 MEDICARE ANNUAL WELLNESS VISIT, SUBSEQUENT: ICD-10-CM

## 2025-04-07 DIAGNOSIS — Z00.00 ROUTINE GENERAL MEDICAL EXAMINATION AT A HEALTH CARE FACILITY: Primary | ICD-10-CM

## 2025-04-07 DIAGNOSIS — F41.9 ANXIETY: Chronic | ICD-10-CM

## 2025-04-07 DIAGNOSIS — M25.40 SWELLING OF MULTIPLE JOINTS: ICD-10-CM

## 2025-04-07 DIAGNOSIS — E55.9 VITAMIN D DEFICIENCY: ICD-10-CM

## 2025-04-07 RX ORDER — LEVOTHYROXINE SODIUM 50 UG/1
50 TABLET ORAL DAILY
Qty: 90 TABLET | Refills: 1 | Status: SHIPPED | OUTPATIENT
Start: 2025-04-07

## 2025-04-07 RX ORDER — CELECOXIB 200 MG/1
200 CAPSULE ORAL DAILY
Qty: 90 CAPSULE | Refills: 1 | Status: SHIPPED | OUTPATIENT
Start: 2025-04-07

## 2025-04-07 NOTE — PROGRESS NOTES
Subjective   The ABCs of the Annual Wellness Visit  Medicare Wellness Visit      Shayy Fajardo is a 54 y.o. patient who presents for a Medicare Wellness Visit.    The following portions of the patient's history were reviewed and   updated as appropriate: allergies, current medications, past family history, past medical history, past social history, past surgical history, and problem list.    Compared to one year ago, the patient's physical   health is the same.  Compared to one year ago, the patient's mental   health is the same.    Recent Hospitalizations:  She was not admitted to the hospital during the last year.     Current Medical Providers:  Patient Care Team:  Brian Lua MD as PCP - General (Family Medicine)    Outpatient Medications Prior to Visit   Medication Sig Dispense Refill    clonazePAM (KlonoPIN) 0.5 MG tablet Take 1 tablet by mouth At Night As Needed.      escitalopram (LEXAPRO) 20 MG tablet Take 0.5 tablets by mouth Daily.      estradiol (ESTRACE) 0.1 MG/GM vaginal cream       gabapentin (NEURONTIN) 300 MG capsule TAKE TWO CAPSULES BY MOUTH TWICE A  capsule 1    Ginger, Zingiber officinalis, (Ginger Root) 550 MG capsule Take  by mouth.      ipratropium (ATROVENT) 0.03 % nasal spray 2 sprays into the nostril(s) as directed by provider Every 12 (Twelve) Hours. 30 mL 2    Kesimpta 20 MG/0.4ML solution auto-injector Inject  under the skin into the appropriate area as directed Every 28 (Twenty-Eight) Days.      Lidocaine 4 % patch Apply  topically As Needed.      methylPREDNISolone (MEDROL) 4 MG dose pack Take as directed on package instructions. 21 tablet 0    modafinil (PROVIGIL) 100 MG tablet Take 0.5 tablets by mouth Daily.      omeprazole (priLOSEC) 40 MG capsule Take 1 capsule by mouth Daily.      ondansetron ODT (ZOFRAN-ODT) 4 MG disintegrating tablet Place 1 tablet on the tongue.      tiZANidine (ZANAFLEX) 4 MG tablet Take 1 tablet by mouth 4 (Four) Times a Day.      vitamin  "B-12 (CYANOCOBALAMIN) 1000 MCG tablet Take 1 tablet by mouth Daily.      Vitamin D, Cholecalciferol, 25 MCG (1000 UT) capsule Take  by mouth.      celecoxib (CeleBREX) 200 MG capsule Take 1 capsule by mouth Daily. 30 capsule 5    doxycycline (VIBRAMYCIN) 100 MG capsule Take 1 capsule by mouth 2 (Two) Times a Day. 20 capsule 0    levothyroxine (SYNTHROID, LEVOTHROID) 50 MCG tablet TAKE 1 TABLET BY MOUTH DAILY 90 tablet 0    meloxicam (Mobic) 7.5 MG tablet Take 1 tablet by mouth Daily. 90 tablet 1    methylPREDNISolone (MEDROL) 4 MG dose pack Take 1 tablet by mouth Daily. Use as directed by package instructions 21 tablet 0    metroNIDAZOLE (FLAGYL) 500 MG tablet       promethazine-dextromethorphan (PROMETHAZINE-DM) 6.25-15 MG/5ML syrup Take 5 mL by mouth 4 (Four) Times a Day As Needed for Cough. 120 mL 0     No facility-administered medications prior to visit.     No opioid medication identified on active medication list. I have reviewed chart for other potential  high risk medication/s and harmful drug interactions in the elderly.      Aspirin is not on active medication list.  Aspirin use is not indicated based on review of current medical condition/s. Risk of harm outweighs potential benefits.  .    Patient Active Problem List   Diagnosis    Multiple sclerosis    Gait abnormality    Spasms of the hands or feet    Chronic fatigue    Anxiety    Depressive disorder    Tremor    Fracture of sacrum    Muscle pain    Pleurisy    Swelling of multiple joints     Advance Care Planning Advance Directive is not on file.  ACP discussion was held with the patient during this visit. Patient does not have an advance directive, information provided.            Objective   Vitals:    04/07/25 0933   BP: 140/94   Pulse: 61   SpO2: 96%   Weight: 47.2 kg (104 lb)   Height: 154.9 cm (61\")   PainSc: 0-No pain       Estimated body mass index is 19.65 kg/m² as calculated from the following:    Height as of this encounter: 154.9 cm " "(61\").    Weight as of this encounter: 47.2 kg (104 lb).    BMI is within normal parameters. No other follow-up for BMI required.           Does the patient have evidence of cognitive impairment? No                                                                                                Health  Risk Assessment    Smoking Status:  Social History     Tobacco Use   Smoking Status Never    Passive exposure: Past   Smokeless Tobacco Never     Alcohol Consumption:  Social History     Substance and Sexual Activity   Alcohol Use Never       Fall Risk Screen  STEADI Fall Risk Assessment was completed, and patient is at HIGH risk for falls. Assessment completed on:2025    Depression Screening   Little interest or pleasure in doing things? Not at all   Feeling down, depressed, or hopeless? Several days   PHQ-2 Total Score 1      Health Habits and Functional and Cognitive Screenin/7/2025     9:36 AM   Functional & Cognitive Status   Do you have difficulty preparing food and eating? No   Do you have difficulty bathing yourself, getting dressed or grooming yourself? No   Do you have difficulty using the toilet? No   Do you have difficulty moving around from place to place? Yes   Do you have trouble with steps or getting out of a bed or a chair? Yes   Current Diet Well Balanced Diet   Dental Exam Not up to date   Eye Exam Up to date   Exercise (times per week) 7 times per week   Current Exercises Include Other   Do you need help using the phone?  No   Are you deaf or do you have serious difficulty hearing?  No   Do you need help to go to places out of walking distance? No   Do you need help shopping? No   Do you need help preparing meals?  No   Do you need help with housework?  No   Do you need help with laundry? No   Do you need help taking your medications? No   Do you need help managing money? No   Do you ever drive or ride in a car without wearing a seat belt? No   Have you felt unusual stress, anger or " loneliness in the last month? Yes   Who do you live with? Spouse   If you need help, do you have trouble finding someone available to you? No   Have you been bothered in the last four weeks by sexual problems? No   Do you have difficulty concentrating, remembering or making decisions? Yes           Age-appropriate Screening Schedule:  Refer to the list below for future screening recommendations based on patient's age, sex and/or medical conditions. Orders for these recommended tests are listed in the plan section. The patient has been provided with a written plan.    Health Maintenance List  Health Maintenance   Topic Date Due    TDAP/TD VACCINES (1 - Tdap) Never done    MAMMOGRAM  05/11/2017    COLORECTAL CANCER SCREENING  Never done    ANNUAL WELLNESS VISIT  Never done    Pneumococcal Vaccine 50+ (1 of 1 - PCV) Never done    ZOSTER VACCINE (1 of 2) Never done    COVID-19 Vaccine (1 - 2024-25 season) Never done    INFLUENZA VACCINE  07/01/2025    HEPATITIS C SCREENING  Completed                                                                                                                                                CMS Preventative Services Quick Reference  Risk Factors Identified During Encounter  None Identified    The above risks/problems have been discussed with the patient.  Pertinent information has been shared with the patient in the After Visit Summary.  An After Visit Summary and PPPS were made available to the patient.    Follow Up:   Next Medicare Wellness visit to be scheduled in 1 year.         Additional E&M Note during same encounter follows:  Patient has additional, significant, and separately identifiable condition(s)/problem(s) that require work above and beyond the Medicare Wellness Visit     Chief Complaint  Annual Exam    Subjective    HPI  Shayy is also being seen today for additional medical problem/s.       The patient presents for a wellness checkup.    She reports experiencing  "dizziness upon standing, which she describes as severe enough to induce a sensation of impending fainting. She has been experiencing persistent bilateral tinnitus and requests an ear examination. She has not undergone a colonoscopy but has had a sigmoidoscopy in the past due to specific health concerns. A recent mammogram revealed a cyst in her right breast, necessitating a follow-up appointment in 6 months. She is currently on Klonopin, administered at night, which aids in sleep induction. She is also taking Celebrex, prescribed by an orthopedic surgeon at [facility]. However, her care has since been transferred to a rheumatologist at  due to the development of hand nodules. These nodules cause swelling, numbness, tingling, and burning sensations, disrupting her sleep. She has been receiving injections for this condition. She is also on gabapentin 300 mg, taken twice daily.    FAMILY HISTORY  Her mother has been diagnosed with melanoma that started under her breast and has now progressed to all of her lymph nodes. Her mother is also on immunotherapy. Her maternal aunt had dementia and Alzheimer's disease and recently passed away. Her grandmother also had dementia and Alzheimer's disease. Her maternal uncle had colon cancer and an aneurysm in the main artery. Her mother also had colon cancer.    MEDICATIONS  Current: Klonopin, Celebrex, ipratropium nasal spray, gabapentin.  Discontinued: Meloxicam.    IMMUNIZATIONS  She is eligible for influenza vaccine, shingles vaccine, tetanus vaccine, and pneumonia vaccine.          Objective   Vital Signs:  /94   Pulse 61   Ht 154.9 cm (61\")   Wt 47.2 kg (104 lb)   SpO2 96%   BMI 19.65 kg/m²   Physical Exam  Vitals and nursing note reviewed.   Constitutional:       General: She is not in acute distress.     Appearance: Normal appearance. She is not ill-appearing.   HENT:      Head: Normocephalic and atraumatic.      Right Ear: Tympanic membrane and ear canal " normal.      Left Ear: Tympanic membrane and ear canal normal.      Nose: Nose normal.   Cardiovascular:      Rate and Rhythm: Normal rate and regular rhythm.      Heart sounds: Normal heart sounds.   Pulmonary:      Effort: Pulmonary effort is normal.      Breath sounds: Normal breath sounds.   Neurological:      Mental Status: She is alert and oriented to person, place, and time. Mental status is at baseline.      Gait: Gait abnormal.   Psychiatric:         Mood and Affect: Mood normal.           There is no wax in the ears. The eardrums appear normal with no fluid behind them.    Vital Signs  Blood pressure is slightly elevated.            Results             Assessment and Plan Additional age appropriate preventative wellness advice topics were discussed during today's preventative wellness exam(some topics already addressed during AWV portion of the note above):    Physical Activity: Advised cardiovascular activity 150 minutes per week as tolerated. (example brisk walk for 30 minutes, 5 days a week).     Nutrition: Discussed nutrition plan with patient. Information shared in after visit summary. Goal is for a well balanced diet to enhance overall health.     Healthy Weight: Discussed current and goal BMI with patient. Steps to attain this goal discussed. Information shared in after visit summary.       1. Wellness check.  Her blood pressure readings are slightly elevated today, potentially due to stress. She experienced dizziness and near syncope yesterday, which could indicate hypotension or dehydration. Her thyroid function has been stable in recent tests. She has not had a colonoscopy but has had a sigmoidoscopy in the past due to specific health concerns. A recent mammogram revealed a cyst in her right breast, necessitating a follow-up appointment in 6 months. She is advised to monitor her blood pressure intermittently. She is encouraged to maintain adequate hydration. A comprehensive blood work panel will  be ordered to assess kidney function, liver function, glucose levels, electrolytes, thyroid function, cholesterol levels, and vitamin D levels. She is advised to consider a colonoscopy given her significant family history of colon cancer. She is eligible for influenza vaccine, shingles vaccine, tetanus vaccine, and pneumonia vaccine but declines these at this time.    2. Tinnitus.  She reports persistent ringing in both ears. Examination reveals no wax buildup or fluid behind the eardrums. The tinnitus could be due to gradual hearing loss or potential damage from various medications taken over the years. She is advised to use background noise, especially during nighttime, to help manage the tinnitus.    3. Medication management.  She is currently taking Klonopin at night to help with sleep and anxiousness. She is also on Celebrex for pain management and gabapentin 300 mg twice a day. Her thyroid medication will be refilled assuming her lab results are normal. She is advised to continue her current medication regimen.    PROCEDURE  The patient has received injections for hand nodules in the past.    A sigmoidoscopy was performed in the past due to specific health concerns.         Follow Up   No follow-ups on file.  Patient was given instructions and counseling regarding her condition or for health maintenance advice. Please see specific information pulled into the AVS if appropriate.  Patient or patient representative verbalized consent for the use of Ambient Listening during the visit with  Brian Lua MD for chart documentation. 4/7/2025  10:04 EDT

## 2025-04-08 DIAGNOSIS — M25.50 ARTHRALGIA, UNSPECIFIED JOINT: ICD-10-CM

## 2025-04-08 LAB
25(OH)D3+25(OH)D2 SERPL-MCNC: 21.1 NG/ML (ref 30–100)
ALBUMIN SERPL-MCNC: 4.4 G/DL (ref 3.8–4.9)
ALP SERPL-CCNC: 100 IU/L (ref 44–121)
ALT SERPL-CCNC: 12 IU/L (ref 0–32)
AST SERPL-CCNC: 22 IU/L (ref 0–40)
BASOPHILS # BLD AUTO: 0.1 X10E3/UL (ref 0–0.2)
BASOPHILS NFR BLD AUTO: 1 %
BILIRUB SERPL-MCNC: <0.2 MG/DL (ref 0–1.2)
BUN SERPL-MCNC: 20 MG/DL (ref 6–24)
BUN/CREAT SERPL: 22 (ref 9–23)
CALCIUM SERPL-MCNC: 9.6 MG/DL (ref 8.7–10.2)
CHLORIDE SERPL-SCNC: 106 MMOL/L (ref 96–106)
CHOLEST SERPL-MCNC: 274 MG/DL (ref 100–199)
CO2 SERPL-SCNC: 24 MMOL/L (ref 20–29)
CREAT SERPL-MCNC: 0.93 MG/DL (ref 0.57–1)
EGFRCR SERPLBLD CKD-EPI 2021: 74 ML/MIN/1.73
EOSINOPHIL # BLD AUTO: 0.2 X10E3/UL (ref 0–0.4)
EOSINOPHIL NFR BLD AUTO: 4 %
ERYTHROCYTE [DISTWIDTH] IN BLOOD BY AUTOMATED COUNT: 13.3 % (ref 11.7–15.4)
GLOBULIN SER CALC-MCNC: 2.2 G/DL (ref 1.5–4.5)
GLUCOSE SERPL-MCNC: 89 MG/DL (ref 70–99)
HCT VFR BLD AUTO: 40.7 % (ref 34–46.6)
HDLC SERPL-MCNC: 71 MG/DL
HGB BLD-MCNC: 13.2 G/DL (ref 11.1–15.9)
IMM GRANULOCYTES # BLD AUTO: 0 X10E3/UL (ref 0–0.1)
IMM GRANULOCYTES NFR BLD AUTO: 0 %
LDLC SERPL CALC-MCNC: 190 MG/DL (ref 0–99)
LYMPHOCYTES # BLD AUTO: 1.2 X10E3/UL (ref 0.7–3.1)
LYMPHOCYTES NFR BLD AUTO: 23 %
MCH RBC QN AUTO: 29.1 PG (ref 26.6–33)
MCHC RBC AUTO-ENTMCNC: 32.4 G/DL (ref 31.5–35.7)
MCV RBC AUTO: 90 FL (ref 79–97)
MONOCYTES # BLD AUTO: 0.6 X10E3/UL (ref 0.1–0.9)
MONOCYTES NFR BLD AUTO: 12 %
NEUTROPHILS # BLD AUTO: 3 X10E3/UL (ref 1.4–7)
NEUTROPHILS NFR BLD AUTO: 60 %
PLATELET # BLD AUTO: 357 X10E3/UL (ref 150–450)
POTASSIUM SERPL-SCNC: 5 MMOL/L (ref 3.5–5.2)
PROT SERPL-MCNC: 6.6 G/DL (ref 6–8.5)
RBC # BLD AUTO: 4.54 X10E6/UL (ref 3.77–5.28)
SODIUM SERPL-SCNC: 142 MMOL/L (ref 134–144)
TRIGL SERPL-MCNC: 82 MG/DL (ref 0–149)
TSH SERPL DL<=0.005 MIU/L-ACNC: 0.53 UIU/ML (ref 0.45–4.5)
VLDLC SERPL CALC-MCNC: 13 MG/DL (ref 5–40)
WBC # BLD AUTO: 5 X10E3/UL (ref 3.4–10.8)

## 2025-04-08 RX ORDER — METHYLPREDNISOLONE 4 MG/1
TABLET ORAL
Qty: 21 TABLET | Refills: 0 | OUTPATIENT
Start: 2025-04-08

## 2025-04-08 RX ORDER — DEXTROMETHORPHAN HYDROBROMIDE AND PROMETHAZINE HYDROCHLORIDE 15; 6.25 MG/5ML; MG/5ML
SYRUP ORAL
Qty: 120 ML | Refills: 0 | OUTPATIENT
Start: 2025-04-08

## 2025-04-08 NOTE — TELEPHONE ENCOUNTER
I spoke to patient who stated she [facility] for a medication refill and did not mean to request this medication.    No further action needed.

## 2025-04-13 ENCOUNTER — RESULTS FOLLOW-UP (OUTPATIENT)
Dept: FAMILY MEDICINE CLINIC | Facility: CLINIC | Age: 55
End: 2025-04-13
Payer: MEDICARE

## 2025-04-14 ENCOUNTER — TELEPHONE (OUTPATIENT)
Dept: FAMILY MEDICINE CLINIC | Facility: CLINIC | Age: 55
End: 2025-04-14
Payer: MEDICARE

## 2025-04-14 RX ORDER — ERGOCALCIFEROL 1.25 MG/1
50000 CAPSULE, LIQUID FILLED ORAL WEEKLY
Qty: 5 CAPSULE | Refills: 5 | Status: SHIPPED | OUTPATIENT
Start: 2025-04-14

## 2025-04-14 NOTE — TELEPHONE ENCOUNTER
Patient got a letter in the mail to contact the office to schedule an appointment to discuss her abnormal labs. Lua's next opening is May 8th and the patient does not want to wait that long for her results. Can we work her in sooner to discuss the results please?

## 2025-04-14 NOTE — TELEPHONE ENCOUNTER
Patient is her cholesterol and vitamin D.  Neither 1 are emergent.  He can actually wait until that date.  I can go ahead and start her on a higher dose of vitamin D until she comes in and sees me.  It will be for a once weekly dose.

## 2025-04-15 NOTE — TELEPHONE ENCOUNTER
Patient called back and has been scheduled for next opening. I let her know Dr. Lua sent in vitamin D to the pharmacy. Pt will pick that prescription up today.

## 2025-05-05 RX ORDER — LEVOTHYROXINE SODIUM 50 UG/1
50 TABLET ORAL DAILY
Qty: 90 TABLET | Refills: 1 | OUTPATIENT
Start: 2025-05-05

## 2025-05-09 ENCOUNTER — TELEMEDICINE (OUTPATIENT)
Dept: FAMILY MEDICINE CLINIC | Facility: CLINIC | Age: 55
End: 2025-05-09
Payer: MEDICARE

## 2025-05-09 DIAGNOSIS — E55.9 VITAMIN D DEFICIENCY: ICD-10-CM

## 2025-05-09 DIAGNOSIS — E78.2 MIXED HYPERLIPIDEMIA: Primary | ICD-10-CM

## 2025-05-09 RX ORDER — ATORVASTATIN CALCIUM 10 MG/1
10 TABLET, FILM COATED ORAL DAILY
Qty: 90 TABLET | Refills: 1 | Status: SHIPPED | OUTPATIENT
Start: 2025-05-09

## 2025-05-11 NOTE — PROGRESS NOTES
Mode of Visit: Video  Location of patient: home  Location of provider: Office.  You have chosen to receive care through a telehealth visit.  The patient has signed the video visit consent form.  The visit included audio and video interaction. No technical issues occurred during this visit.     Chief Complaint  No chief complaint on file.      Shayy Fajardo presents to [facility]      Patient seen in follow-up of recent blood work that was done that showed abnormal labs.  Hyperlipidemia is pronounced.  Has gone up over time.  Strong family history.  Patient also has lower vitamin D level.    Review of Systems    Objective   Vital Signs:   There were no vitals taken for this visit.    Physical Exam   Constitutional: She appears well-developed and well-nourished.   Psychiatric: She has a normal mood and affect.       BMI is within normal parameters. No other follow-up for BMI required.            Assessment and Plan    Diagnoses and all orders for this visit:    1. Mixed hyperlipidemia (Primary)    2. Vitamin D deficiency    Other orders  -     atorvastatin (LIPITOR) 10 MG tablet; Take 1 tablet by mouth Daily.  Dispense: 90 tablet; Refill: 1        We will start atorvastatin for her hyperlipidemia and recheck labs in 3 months.  Patient also started on once weekly vitamin D dosing by prescription.  Also recheck 3 months.    Follow Up   No follow-ups on file.  Patient was given instructions and counseling regarding her condition or for health maintenance advice. Please see specific information pulled into the AVS if appropriate.

## 2025-05-21 ENCOUNTER — TELEPHONE (OUTPATIENT)
Dept: FAMILY MEDICINE CLINIC | Facility: CLINIC | Age: 55
End: 2025-05-21
Payer: MEDICARE

## 2025-05-21 RX ORDER — ROSUVASTATIN CALCIUM 5 MG/1
5 TABLET, COATED ORAL DAILY
Qty: 30 TABLET | Refills: 1 | Status: SHIPPED | OUTPATIENT
Start: 2025-05-21

## 2025-05-21 NOTE — TELEPHONE ENCOUNTER
Caller: Shayy Fajardo    Relationship: Self    Best call back number:   Telephone Information:   Mobile [phone]        What medication are you requesting: SOMETHING OTHER THAN atorvastatin (LIPITOR) 10 MG tablet    Have you had these symptoms before:    [x] Yes  [] No    Have you been treated for these symptoms before:   [x] Yes  [] No    If a prescription is needed, what is your preferred pharmacy and phone number: [facility] [id number] [place], [place] - [address] [place] [address] [phone] [facility] [phone] FX     Additional notes: PATIENT STATED THE atorvastatin (LIPITOR) 10 MG tablet MADE HER LEGS AND FEET HURT. SHE HAD BEEN TAKING THIS MEDICATION FOR 4 DAYS

## 2025-05-28 ENCOUNTER — TELEPHONE (OUTPATIENT)
Dept: FAMILY MEDICINE CLINIC | Facility: CLINIC | Age: 55
End: 2025-05-28
Payer: MEDICARE

## 2025-07-29 RX ORDER — DEXTROMETHORPHAN HYDROBROMIDE AND PROMETHAZINE HYDROCHLORIDE 15; 6.25 MG/5ML; MG/5ML
SYRUP ORAL
Qty: 120 ML | Refills: 0 | OUTPATIENT
Start: 2025-07-29

## 2025-08-25 RX ORDER — ROSUVASTATIN CALCIUM 5 MG/1
5 TABLET, COATED ORAL DAILY
Qty: 90 TABLET | Refills: 0 | Status: SHIPPED | OUTPATIENT
Start: 2025-08-25